# Patient Record
Sex: FEMALE | Race: WHITE | NOT HISPANIC OR LATINO | Employment: FULL TIME | URBAN - METROPOLITAN AREA
[De-identification: names, ages, dates, MRNs, and addresses within clinical notes are randomized per-mention and may not be internally consistent; named-entity substitution may affect disease eponyms.]

---

## 2017-01-09 ENCOUNTER — MYC MEDICAL ADVICE (OUTPATIENT)
Dept: FAMILY MEDICINE | Facility: CLINIC | Age: 42
End: 2017-01-09

## 2017-01-11 ENCOUNTER — OFFICE VISIT (OUTPATIENT)
Dept: FAMILY MEDICINE | Facility: CLINIC | Age: 42
End: 2017-01-11
Payer: COMMERCIAL

## 2017-01-11 VITALS
HEIGHT: 67 IN | BODY MASS INDEX: 25.15 KG/M2 | HEART RATE: 118 BPM | TEMPERATURE: 98.3 F | OXYGEN SATURATION: 98 % | WEIGHT: 160.25 LBS | SYSTOLIC BLOOD PRESSURE: 144 MMHG | DIASTOLIC BLOOD PRESSURE: 72 MMHG

## 2017-01-11 DIAGNOSIS — N63.0 LUMP OR MASS IN BREAST: Primary | ICD-10-CM

## 2017-01-11 PROCEDURE — 99213 OFFICE O/P EST LOW 20 MIN: CPT | Performed by: FAMILY MEDICINE

## 2017-01-11 ASSESSMENT — PAIN SCALES - GENERAL: PAINLEVEL: NO PAIN (0)

## 2017-01-11 NOTE — PROGRESS NOTES
SUBJECTIVE:                                                    S:  Bhavana Becker is a 41 year old female who presents to the clinic complaining of a right sided breast lump. Patient states that she was doing a routine self breast exam about a week ago. She noticed a hard lump on the right breast, under her nipple. She says that this lump is hard, immobile, and is not painful. Patient had a Mammogram and U/S in 2015 after experiencing nipple discharge, at which time a couple simple cysts were found on the right breast. Patient denies any nipple leakage, pain, swelling, redness, or other associated symptoms. No PMHx of FHx of breast cancer.     Problem list and histories reviewed & adjusted, as indicated.  Additional history: as documented    Patient Active Problem List   Diagnosis     CARDIOVASCULAR SCREENING; LDL GOAL LESS THAN 160     Past Surgical History   Procedure Laterality Date     Dilation and curettage, operative hysteroscopy, combined N/A 1/15/2015     removal of endometrial polyp       Social History   Substance Use Topics     Smoking status: Former Smoker -- 1.00 packs/day for 10 years     Types: Cigarettes     Quit date: 2000     Smokeless tobacco: Not on file      Comment: quit smoking in .     Alcohol Use: No     Family History   Problem Relation Age of Onset     CANCER No family hx of      breast     CEREBROVASCULAR DISEASE Maternal Grandmother       of this in her  60-70's     Lipids Mother      Hypertension Mother      Arthritis Mother      age 63.     Circulatory Mother      Gynecology Mother      cyst on ovary removed at age 34     Cancer - colorectal Maternal Grandfather      at age 70's     Respiratory Maternal Grandfather      lung Cancer and empysema     CANCER Paternal Grandmother      brain cancer at age 80's.     GASTROINTESTINAL DISEASE Father      gallbladder removed at age 61     Lipids Father      CEREBROVASCULAR DISEASE Father      X2       HEART DISEASE  "Father      pacemaker/ablation 2009     HEART DISEASE Mother      SOB, elev. BP 11/09 - believed to be MI, but confirmed no MI (RBBB)         Current Outpatient Prescriptions   Medication Sig Dispense Refill     IBUPROFEN PO Take 400 mg by mouth every 6 hours as needed for moderate pain       Multiple Vitamin (MULTI-VITAMIN PO) Take  by mouth. 1 daily       Allergies   Allergen Reactions     Codeine      Propoxyphene      Darvocet     Sulfa Drugs Other (See Comments)     Corneal ulcer from eye drops     ROS:  All other ROS are negative or non-contributory except as stated in HPI.    OBJECTIVE:                                                    /72 mmHg  Pulse 118  Temp(Src) 98.3  F (36.8  C) (Tympanic)  Ht 5' 7.3\" (1.709 m)  Wt 160 lb 4 oz (72.689 kg)  BMI 24.89 kg/m2  SpO2 98%  Body mass index is 24.89 kg/(m^2).   Vitals noted.  Patient alert, oriented, and in no acute distress.  CV:  RRR without murmur.  Respiratory:  Lungs clear to auscultation bilaterally.  Breasts: Visual inspection of the breasts is normal without skin changes.  Palpation reveals a small, sub-cm, firm nodule at 8 o'clock on the right breast, just adjacent to the areola. No axillary masses.    Diagnostic Test Results:  Orders Placed This Encounter   Procedures     MA Diagnostic Digital Bilateral     US Breast Bilateral Limited 1-3 Quadrants        ASSESSMENT:                                                        ICD-10-CM    1. Lump or mass in breast N63 MA Diagnostic Digital Bilateral     US Breast Bilateral Limited 1-3 Quadrants       PLAN:                                                      I am able to palpate a firm nodule in the patient's right breast. In her last Mammogram, there were some cystic nodules noted so this could definitely be cystic, but is quite firm and small, so difficult to tell. I encouraged her to further evaluate. Diagnostic Mammogram ordered and scheduled, see orders. I will notify patient with results " and discuss further evaluation/ treatment if needed at that time. Patient is in agreement with this plan. Follow up for diagnostic mammogram, or sooner with questions/ concerns.     This document serves as a record of services personally performed by Flori Acosta MD. It was created on their behalf by Sue Gil, a trained medical scribe. The creation of this record is based on the provider's personal observations and the statements of the patient. This document has been checked and approved by the attending provider.    Flori Acosta MD  [facility]

## 2017-01-11 NOTE — NURSING NOTE
"Chief Complaint   Patient presents with     Mass     Right Breast       Initial /72 mmHg  Pulse 118  Temp(Src) 98.3  F (36.8  C) (Tympanic)  Ht 5' 7.3\" (1.709 m)  Wt 160 lb 4 oz (72.689 kg)  BMI 24.89 kg/m2  SpO2 98% Estimated body mass index is 24.89 kg/(m^2) as calculated from the following:    Height as of this encounter: 5' 7.3\" (1.709 m).    Weight as of this encounter: 160 lb 4 oz (72.689 kg).  BP completed using cuff size: ruiz Pop MA  "

## 2017-01-17 ENCOUNTER — HOSPITAL ENCOUNTER (OUTPATIENT)
Dept: MAMMOGRAPHY | Facility: CLINIC | Age: 42
Discharge: HOME | End: 2017-01-17
Attending: FAMILY MEDICINE | Admitting: FAMILY MEDICINE
Payer: COMMERCIAL

## 2017-01-17 ENCOUNTER — HOSPITAL ENCOUNTER (OUTPATIENT)
Dept: ULTRASOUND IMAGING | Facility: CLINIC | Age: 42
End: 2017-01-17
Attending: FAMILY MEDICINE
Payer: COMMERCIAL

## 2017-01-17 DIAGNOSIS — N63.0 LUMP OR MASS IN BREAST: ICD-10-CM

## 2017-01-17 PROCEDURE — G0204 DX MAMMO INCL CAD BI: HCPCS

## 2017-01-17 PROCEDURE — 76642 ULTRASOUND BREAST LIMITED: CPT | Mod: RT

## 2017-01-24 NOTE — PROGRESS NOTES
Quick Note:    Bhavana, it looks like they already discussed your results of your mammogram and ultrasound, you have a cyst there. Nothing concerning at all, which is good. We really don't need to do anything further on this but if you have any questions or concerns please let me know.   Flori Acosta MD  ______

## 2017-02-21 ENCOUNTER — MYC MEDICAL ADVICE (OUTPATIENT)
Dept: FAMILY MEDICINE | Facility: CLINIC | Age: 42
End: 2017-02-21

## 2017-12-05 ENCOUNTER — OFFICE VISIT (OUTPATIENT)
Dept: FAMILY MEDICINE | Facility: CLINIC | Age: 42
End: 2017-12-05
Payer: COMMERCIAL

## 2017-12-05 VITALS
HEART RATE: 101 BPM | RESPIRATION RATE: 20 BRPM | OXYGEN SATURATION: 96 % | BODY MASS INDEX: 25.3 KG/M2 | HEIGHT: 67 IN | WEIGHT: 161.2 LBS | DIASTOLIC BLOOD PRESSURE: 70 MMHG | SYSTOLIC BLOOD PRESSURE: 110 MMHG | TEMPERATURE: 97.7 F

## 2017-12-05 DIAGNOSIS — Z12.4 SCREENING FOR MALIGNANT NEOPLASM OF CERVIX: ICD-10-CM

## 2017-12-05 DIAGNOSIS — Z00.00 ENCOUNTER FOR ROUTINE ADULT HEALTH EXAMINATION WITHOUT ABNORMAL FINDINGS: Primary | ICD-10-CM

## 2017-12-05 DIAGNOSIS — Z12.39 SCREENING FOR MALIGNANT NEOPLASM OF BREAST: ICD-10-CM

## 2017-12-05 DIAGNOSIS — Z23 NEED FOR PROPHYLACTIC VACCINATION AND INOCULATION AGAINST INFLUENZA: ICD-10-CM

## 2017-12-05 PROCEDURE — 90686 IIV4 VACC NO PRSV 0.5 ML IM: CPT | Performed by: FAMILY MEDICINE

## 2017-12-05 PROCEDURE — 99396 PREV VISIT EST AGE 40-64: CPT | Mod: 25 | Performed by: FAMILY MEDICINE

## 2017-12-05 PROCEDURE — 90471 IMMUNIZATION ADMIN: CPT | Performed by: FAMILY MEDICINE

## 2017-12-05 PROCEDURE — 87624 HPV HI-RISK TYP POOLED RSLT: CPT | Performed by: FAMILY MEDICINE

## 2017-12-05 PROCEDURE — G0145 SCR C/V CYTO,THINLAYER,RESCR: HCPCS | Performed by: FAMILY MEDICINE

## 2017-12-05 NOTE — MR AVS SNAPSHOT
After Visit Summary   12/5/2017    Bhavana Becker    MRN: [id number]           Patient Information     Date Of Birth          1975        Visit Information        Provider Department      12/5/2017 3:00 PM Flori Acosta MD [facility]        Today's Diagnoses     Encounter for routine adult health examination without abnormal findings    -  1    Need for prophylactic vaccination and inoculation against influenza        Screening for malignant neoplasm of breast        Screening for malignant neoplasm of cervix           Follow-ups after your visit        Who to contact     If you have questions or need follow up information about today's clinic visit or your schedule please contact [facility] directly at [phone].  Normal or non-critical lab and imaging results will be communicated to you by MyChart, letter or phone within 4 business days after the clinic has received the results. If you do not hear from us within 7 days, please contact the clinic through [employer] or phone. If you have a critical or abnormal lab result, we will notify you by phone as soon as possible.  Submit refill requests through [employer] or call your pharmacy and they will forward the refill request to us. Please allow 3 business days for your refill to be completed.          Additional Information About Your Visit        MyChart Information     [employer] gives you secure access to your electronic health record. If you see a primary care provider, you can also send messages to your care team and make appointments. If you have questions, please call your primary care clinic.  If you do not have a primary care provider, please call [phone] and they will assist you.        Care EveryWhere ID     This is your Care EveryWhere ID. This could be used by other organizations to access your [facility] medical records  [id number]        Your Vitals Were     Pulse Temperature  "Respirations Height Last Period Pulse Oximetry    101 97.7  F (36.5  C) (Temporal) 20 5' 7.25\" (1.708 m) 11/21/2017 (Approximate) 96%    Breastfeeding? BMI (Body Mass Index)                No 25.06 kg/m2           Blood Pressure from Last 3 Encounters:   12/05/17 110/70   01/11/17 144/72   11/11/16 120/72    Weight from Last 3 Encounters:   12/05/17 161 lb 3.2 oz (73.1 kg)   01/11/17 160 lb 4 oz (72.7 kg)   11/11/16 159 lb 8 oz (72.3 kg)              We Performed the Following     FLU VAC, SPLIT VIRUS IM > 3 YO (QUADRIVALENT) [71595]     HPV High Risk Types DNA Cervical     Pap imaged thin layer screen with HPV - recommended age 30 - 65 years (select HPV order below)     Vaccine Administration, Initial [52960]        Primary Care Provider Office Phone # Fax #    Flori Barbara Acosta [phone] [address] [place] [address]        Equal Access to Services     [facility]: Hadii martha sorto hadasho Soomaali, waaxda luqadaha, qaybta kaalmada emmanuel, gissell wolf. So [facility] [phone].    ATENCIÓN: Si habla español, tiene a mcclain disposición servicios gratuitos de asistencia lingüística. Llame al [phone].    We comply with applicable federal civil rights laws and [place] laws. We do not discriminate on the basis of race, color, national origin, age, disability, sex, sexual orientation, or gender identity.            Thank you!     Thank you for choosing [facility]  for your care. Our goal is always to provide you with excellent care. Hearing back from our patients is one way we can continue to improve our services. Please take a few minutes to complete the written survey that you may receive in the mail after your visit with us. Thank you!             Your Updated Medication List - Protect others around you: Learn how to safely use, store and throw away your medicines at www.disposemymeds.org.          This list is accurate as of: " 12/5/17 11:59 PM.  Always use your most recent med list.                   Brand Name Dispense Instructions for use Diagnosis    IBUPROFEN PO      Take 400 mg by mouth every 6 hours as needed for moderate pain        MULTI-VITAMIN PO      Take  by mouth. 1 daily

## 2017-12-05 NOTE — NURSING NOTE
"Chief Complaint   Patient presents with     Physical       Initial /70  Pulse 101  Temp 97.7  F (36.5  C) (Temporal)  Resp 20  Ht 5' 7.25\" (1.708 m)  Wt 161 lb 3.2 oz (73.1 kg)  SpO2 96%  BMI 25.06 kg/m2 Estimated body mass index is 25.06 kg/(m^2) as calculated from the following:    Height as of this encounter: 5' 7.25\" (1.708 m).    Weight as of this encounter: 161 lb 3.2 oz (73.1 kg).  Medication Reconciliation: complete   Ingris Dela Cruz CMA    "

## 2017-12-05 NOTE — PROGRESS NOTES
SUBJECTIVE:   CC: Bhavana Becker is an 42 year old woman who presents for preventive health visit.     Physical   Annual:     Getting at least 3 servings of Calcium per day::  Yes    Bi-annual eye exam::  Yes    Dental care twice a year::  Yes    Sleep apnea or symptoms of sleep apnea::  None    Diet::  Regular (no restrictions)    Frequency of exercise::  2-3 days/week    Duration of exercise::  15-30 minutes    Taking medications regularly::  Not Applicable    Additional concerns today::  No        Today's PHQ-2 Score: PHQ-2 ( 1999 Pfizer) 12/5/2017   Q1: Little interest or pleasure in doing things 0   Q2: Feeling down, depressed or hopeless 0   PHQ-2 Score 0   Q1: Little interest or pleasure in doing things Not at all   Q2: Feeling down, depressed or hopeless Not at all   PHQ-2 Score 0       Abuse: Current or Past(Physical, Sexual or Emotional)- No  Do you feel safe in your environment - Yes    Social History   Substance Use Topics     Smoking status: Former Smoker     Packs/day: 1.00     Years: 10.00     Types: Cigarettes     Quit date: 6/1/2000     Smokeless tobacco: Not on file      Comment: quit smoking in 2000.     Alcohol use No     The patient does not drink >3 drinks per day nor >7 drinks per week.    Reviewed orders with patient.  Reviewed health maintenance and updated orders accordingly - Yes  BP Readings from Last 3 Encounters:   12/05/17 110/70   01/11/17 144/72   11/11/16 120/72    Wt Readings from Last 3 Encounters:   12/05/17 161 lb 3.2 oz (73.1 kg)   01/11/17 160 lb 4 oz (72.7 kg)   11/11/16 159 lb 8 oz (72.3 kg)                  Patient Active Problem List   Diagnosis     CARDIOVASCULAR SCREENING; LDL GOAL LESS THAN 160     Past Surgical History:   Procedure Laterality Date     DILATION AND CURETTAGE, OPERATIVE HYSTEROSCOPY, COMBINED N/A 1/15/2015    removal of endometrial polyp       Social History   Substance Use Topics     Smoking status: Former Smoker     Packs/day: 1.00     Years:  10.00     Types: Cigarettes     Quit date: 2000     Smokeless tobacco: Never Used      Comment: quit smoking in .     Alcohol use No     Family History   Problem Relation Age of Onset     CEREBROVASCULAR DISEASE Maternal Grandmother       of this in her  60-70's     Lipids Mother      Hypertension Mother      Arthritis Mother      age 63.     Circulatory Mother      Gynecology Mother      cyst on ovary removed at age 34     HEART DISEASE Mother      SOB, elev. BP  - believed to be MI, but confirmed no MI (RBBB)     GASTROINTESTINAL DISEASE Father      gallbladder removed at age 61     Lipids Father      CEREBROVASCULAR DISEASE Father      X2       HEART DISEASE Father      pacemaker/ablation      Cancer - colorectal Maternal Grandfather      at age 70's     Respiratory Maternal Grandfather      lung Cancer and empysema     CANCER Paternal Grandmother      brain cancer at age 80's.         Current Outpatient Prescriptions   Medication Sig Dispense Refill     Multiple Vitamin (MULTI-VITAMIN PO) Take  by mouth. 1 daily       IBUPROFEN PO Take 400 mg by mouth every 6 hours as needed for moderate pain       Allergies   Allergen Reactions     Codeine      Propoxyphene      Darvocet     Sulfa Drugs Other (See Comments)     Corneal ulcer from eye drops           Patient under age 50, mutual decision reflected in health maintenance.        Pertinent mammograms are reviewed under the imaging tab.  History of abnormal Pap smear: NO - age 30- 65 PAP every 3 years recommended    Reviewed and updated as needed this visit by clinical staff         Reviewed and updated as needed this visit by Provider          Review of Systems  C: NEGATIVE for fever, chills, change in weight  I: NEGATIVE for worrisome rashes, moles or lesions  E: NEGATIVE for vision changes or irritation  ENT: NEGATIVE for ear, mouth and throat problems  R: NEGATIVE for significant cough or SOB  B: NEGATIVE for masses, tenderness or  "discharge  CV: NEGATIVE for chest pain, palpitations or peripheral edema  GI: NEGATIVE for nausea, abdominal pain, heartburn, or change in bowel habits  : NEGATIVE for unusual urinary or vaginal symptoms. Periods are regular.  M: NEGATIVE for significant arthralgias or myalgia  N: NEGATIVE for weakness, dizziness or paresthesias  P: NEGATIVE for changes in mood or affect     OBJECTIVE:   /70  Pulse 101  Temp 97.7  F (36.5  C) (Temporal)  Resp 20  Ht 5' 7.25\" (1.708 m)  Wt 161 lb 3.2 oz (73.1 kg)  LMP 11/21/2017 (Approximate)  SpO2 96%  Breastfeeding? No  BMI 25.06 kg/m2  Physical Exam  GENERAL: healthy, alert and no distress  EYES: Eyes grossly normal to inspection, PERRL and conjunctivae and sclerae normal  HENT: ear canals and TM's normal, nose and mouth without ulcers or lesions  NECK: no adenopathy, no asymmetry, masses, or scars and thyroid normal to palpation  RESP: lungs clear to auscultation - no rales, rhonchi or wheezes  BREAST: normal without masses, tenderness or nipple discharge and no palpable axillary masses or adenopathy  CV: regular rate and rhythm, normal S1 S2, no S3 or S4, no murmur, click or rub, no peripheral edema and peripheral pulses strong  ABDOMEN: soft, nontender, no hepatosplenomegaly, no masses and bowel sounds normal  Vaginal exam reveals normal external and internal genitalia.  Cervix is closed, long and thick.  No lesions or abnormalities seen.  Pap collected. Bimanual exam reveals a nontender, nongravid uterus with no CMT.  No adnexal masses or tenderness.    MS: no gross musculoskeletal defects noted, no edema  SKIN: no suspicious lesions or rashes  NEURO: Normal strength and tone, mentation intact and speech normal  PSYCH: mentation appears normal, affect normal/bright    ASSESSMENT/PLAN:       ICD-10-CM    1. Encounter for routine adult health examination without abnormal findings Z00.00    2. Need for prophylactic vaccination and inoculation against influenza " "Z23 FLU VAC, SPLIT VIRUS IM > 3 YO (QUADRIVALENT) [63599]     Vaccine Administration, Initial [41806]   3. Screening for malignant neoplasm of breast Z12.31 MA Screen Bilateral w/Stu   4. Screening for malignant neoplasm of cervix Z12.4 Pap imaged thin layer screen with HPV - recommended age 30 - 65 years (select HPV order below)     HPV High Risk Types DNA Cervical       COUNSELING:  Reviewed preventive health counseling, as reflected in patient instructions       Regular exercise       Healthy diet/nutrition       Vision screening       Immunizations    Vaccinated for: Influenza           Contraception       Osteoporosis Prevention/Bone Health           reports that she quit smoking about 17 years ago. Her smoking use included Cigarettes. She has a 10.00 pack-year smoking history. She does not have any smokeless tobacco history on file.    Estimated body mass index is 24.88 kg/(m^2) as calculated from the following:    Height as of 1/11/17: 5' 7.3\" (1.709 m).    Weight as of 1/11/17: 160 lb 4 oz (72.7 kg).         Counseling Resources:  ATP IV Guidelines  Pooled Cohorts Equation Calculator  Breast Cancer Risk Calculator  FRAX Risk Assessment  ICSI Preventive Guidelines  Dietary Guidelines for Americans, 2010  [employer]'s MyPlate  ASA Prophylaxis  Lung CA Screening    Flori Acosta MD  [facility] Influenza Immunization Documentation    1.  Is the person to be vaccinated sick today?   No    2. Does the person to be vaccinated have an allergy to a component   of the vaccine?   No  Egg Allergy Algorithm Link    3. Has the person to be vaccinated ever had a serious reaction   to influenza vaccine in the past?   No    4. Has the person to be vaccinated ever had Guillain-Barré syndrome?   No    Form completed by Ingris Dela Cruz CMA           "

## 2017-12-07 LAB
COPATH REPORT: NORMAL
PAP: NORMAL

## 2017-12-11 LAB
FINAL DIAGNOSIS: NORMAL
HPV HR 12 DNA CVX QL NAA+PROBE: NEGATIVE
HPV16 DNA SPEC QL NAA+PROBE: NEGATIVE
HPV18 DNA SPEC QL NAA+PROBE: NEGATIVE
SPECIMEN DESCRIPTION: NORMAL

## 2018-01-22 ENCOUNTER — HOSPITAL ENCOUNTER (OUTPATIENT)
Dept: MAMMOGRAPHY | Facility: CLINIC | Age: 43
Discharge: HOME | End: 2018-01-22
Attending: FAMILY MEDICINE | Admitting: FAMILY MEDICINE
Payer: COMMERCIAL

## 2018-01-22 DIAGNOSIS — Z12.39 SCREENING FOR MALIGNANT NEOPLASM OF BREAST: ICD-10-CM

## 2018-01-22 DIAGNOSIS — Z12.31 VISIT FOR SCREENING MAMMOGRAM: ICD-10-CM

## 2018-01-22 PROCEDURE — 77067 SCR MAMMO BI INCL CAD: CPT

## 2018-09-17 ENCOUNTER — VIRTUAL VISIT (OUTPATIENT)
Dept: FAMILY MEDICINE | Facility: OTHER | Age: 43
End: 2018-09-17

## 2018-09-18 NOTE — PROGRESS NOTES
"Date:   Clinician: Holden Concepcion  Clinician NPI: [id number]  Patient: Bhavana Becker  Patient : 1975  Patient Address: [address] [place] [id number]  Patient Phone: [phone]  Visit Protocol: Ear pain  Patient Summary:  Bhavana is a 43 year old ( : 1975 ) female who initiated a Visit for swimmer's ear (ear pain). When asked the question \"Please sign me up to receive news, health information and promotions. \", Bhavana responded \"No\".    Bhavana uploaded images of her ear(s).   Bhavana reports that her ear pain started yesterday. The ear pain is located inside the right ear.    Symptom Details   Pain: Bhavana is experiencing moderate pain. It gets worse when she gently pulls on the earlobe(s). It does not get worse when she eats or chews.    Bhavana denies a feeling of fullness, itchiness, redness, and tenderness in the ear(s). She also denies feeling feverish, having fluid draining from the ear(s), recent injuries near the ear(s), ever having ear tubes, and the possibility of a foreign object in the ear(s). Bhavana denies flying and swimming within the past week.    Weight: 75 lbs   She denies pregnancy and denies breastfeeding. She does not menstruate.   She does not smoke or use smokeless tobacco.   MEDICATIONS: No current medications, ALLERGIES: NKDA  Clinician Response:  Dear Bhavana,  Based upon the information you provided, you may have otitis externa. External otitis, also known as swimmer's ear, is a condition that occurs when the ear canal becomes irritated or infected.   I am prescribing:   Neomycin-polymyxin-hydrocortisone 1% otic ear drops. Instill 4 drops into affected ear(s) 3 times per day for 7 days. There are no refills with this prescription.   Instructions for using eardrops:     Lie on your side or tilt your head    Insert the drops into your ear canal    Lie on your side or insert a cotton ball in the ear canal for 5 minutes    Use the medication for " the number of days recommended, even if you begin to feel better within a few days after starting the drops     Avoid getting water inside your ear while you are being treated for swimmer's ear.  Use a cotton ball coated with petroleum jelly to protect your ears when bathing and showering.  Do not go swimming or diving for the next 7-10 days.  Do not wear headphones or hearing aid in the infected ears until your symptoms improve.  Please see your healthcare provider or urgent care clinic if your symptoms do not improve within 2 days.   Diagnosis: Otitis externa  Diagnosis ICD: H60.399  Prescription: neomycin-polymyxin-HC 3.5-10,000-1 mg/mL-unit/mL-% otic (ear) drops,suspension 1 10 ml dropper bottle, 7 days supply. Instill 4 drops into affected ear(s) 3 times per day for 7 days. Refills: 0, Refill as needed: no, Allow substitutions: yes  Pharmacy: [place] [id number] [place] [facility] - [phone] - [address] [id number]

## 2018-12-07 ENCOUNTER — OFFICE VISIT (OUTPATIENT)
Dept: FAMILY MEDICINE | Facility: CLINIC | Age: 43
End: 2018-12-07
Payer: COMMERCIAL

## 2018-12-07 VITALS
TEMPERATURE: 98.1 F | DIASTOLIC BLOOD PRESSURE: 86 MMHG | BODY MASS INDEX: 26.24 KG/M2 | HEIGHT: 67 IN | HEART RATE: 78 BPM | WEIGHT: 167.2 LBS | SYSTOLIC BLOOD PRESSURE: 124 MMHG | RESPIRATION RATE: 20 BRPM | OXYGEN SATURATION: 97 %

## 2018-12-07 DIAGNOSIS — Z00.00 ENCOUNTER FOR ROUTINE ADULT HEALTH EXAMINATION WITHOUT ABNORMAL FINDINGS: Primary | ICD-10-CM

## 2018-12-07 DIAGNOSIS — Z12.39 SCREENING FOR MALIGNANT NEOPLASM OF BREAST: ICD-10-CM

## 2018-12-07 DIAGNOSIS — G43.829 MENSTRUAL MIGRAINE WITHOUT STATUS MIGRAINOSUS, NOT INTRACTABLE: ICD-10-CM

## 2018-12-07 DIAGNOSIS — N92.0 MENORRHAGIA WITH REGULAR CYCLE: ICD-10-CM

## 2018-12-07 DIAGNOSIS — Z23 NEED FOR PROPHYLACTIC VACCINATION AND INOCULATION AGAINST INFLUENZA: ICD-10-CM

## 2018-12-07 LAB
HGB BLD-MCNC: 13.8 G/DL (ref 11.7–15.7)
TSH SERPL DL<=0.005 MIU/L-ACNC: 1.16 MU/L (ref 0.4–4)

## 2018-12-07 PROCEDURE — 90686 IIV4 VACC NO PRSV 0.5 ML IM: CPT | Performed by: FAMILY MEDICINE

## 2018-12-07 PROCEDURE — 85018 HEMOGLOBIN: CPT | Performed by: FAMILY MEDICINE

## 2018-12-07 PROCEDURE — 90471 IMMUNIZATION ADMIN: CPT | Performed by: FAMILY MEDICINE

## 2018-12-07 PROCEDURE — 84443 ASSAY THYROID STIM HORMONE: CPT | Performed by: FAMILY MEDICINE

## 2018-12-07 PROCEDURE — 36415 COLL VENOUS BLD VENIPUNCTURE: CPT | Performed by: FAMILY MEDICINE

## 2018-12-07 PROCEDURE — 99396 PREV VISIT EST AGE 40-64: CPT | Mod: 25 | Performed by: FAMILY MEDICINE

## 2018-12-07 ASSESSMENT — ENCOUNTER SYMPTOMS
BREAST MASS: 0
FREQUENCY: 0
NAUSEA: 0
EYE PAIN: 0
SORE THROAT: 0
PARESTHESIAS: 0
COUGH: 0
CONSTIPATION: 0
SHORTNESS OF BREATH: 0
HEMATURIA: 0
NERVOUS/ANXIOUS: 0
DIZZINESS: 0
MYALGIAS: 0
ARTHRALGIAS: 0
FEVER: 0
CHILLS: 0
WEAKNESS: 0
PALPITATIONS: 0
DYSURIA: 0
HEMATOCHEZIA: 0
DIARRHEA: 0
HEADACHES: 0
JOINT SWELLING: 0
HEARTBURN: 0
ABDOMINAL PAIN: 0

## 2018-12-07 ASSESSMENT — PAIN SCALES - GENERAL: PAINLEVEL: NO PAIN (0)

## 2018-12-07 NOTE — MR AVS SNAPSHOT
After Visit Summary   12/7/2018    Bhavana Becker    MRN: [id number]           Patient Information     Date Of Birth          1975        Visit Information        Provider Department      12/7/2018 8:00 AM Flori Acosta MD [facility]        Today's Diagnoses     Encounter for routine adult health examination without abnormal findings    -  1    Menorrhagia with regular cycle        Menstrual migraine without status migrainosus, not intractable        Screening for malignant neoplasm of breast        Need for prophylactic vaccination and inoculation against influenza          Care Instructions      Preventive Health Recommendations  Female Ages 40 to 49    Yearly exam:     See your health care provider every year in order to  1. Review health changes.   2. Discuss preventive care.    3. Review your medicines if your doctor prescribed any.      Get a Pap test every three years (unless you have an abnormal result and your provider advises testing more often).      If you get Pap tests with HPV test, you only need to test every 5 years, unless you have an abnormal result. You do not need a Pap test if your uterus was removed (hysterectomy) and you have not had cancer.      You should be tested each year for STDs (sexually transmitted diseases), if you're at risk.     Ask your doctor if you should have a mammogram.      Have a colonoscopy (test for colon cancer) if someone in your family has had colon cancer or polyps before age 50.       Have a cholesterol test every 5 years.       Have a diabetes test (fasting glucose) after age 45. If you are at risk for diabetes, you should have this test every 3 years.    Shots: Get a flu shot each year. Get a tetanus shot every 10 years.     Nutrition:     Eat at least 5 servings of fruits and vegetables each day.    Eat whole-grain bread, whole-wheat pasta and brown rice instead of white grains and rice.    Get adequate Calcium  and Vitamin D.      Lifestyle    Exercise at least 150 minutes a week (an average of 30 minutes a day, 5 days a week). This will help you control your weight and prevent disease.    Limit alcohol to one drink per day.    No smoking.     Wear sunscreen to prevent skin cancer.    See your dentist every six months for an exam and cleaning.          Follow-ups after your visit        Future tests that were ordered for you today     Open Future Orders        Priority Expected Expires Ordered    MA Screen Bilateral w/Stu Routine  12/7/2019 12/7/2018            Who to contact     If you have questions or need follow up information about today's clinic visit or your schedule please contact [facility] directly at [phone].  Normal or non-critical lab and imaging results will be communicated to you by The Web Collaboration Networkhart, letter or phone within 4 business days after the clinic has received the results. If you do not hear from us within 7 days, please contact the clinic through TellApartt or phone. If you have a critical or abnormal lab result, we will notify you by phone as soon as possible.  Submit refill requests through Face-Me or call your pharmacy and they will forward the refill request to us. Please allow 3 business days for your refill to be completed.          Additional Information About Your Visit        Face-Me Information     Face-Me gives you secure access to your electronic health record. If you see a primary care provider, you can also send messages to your care team and make appointments. If you have questions, please call your primary care clinic.  If you do not have a primary care provider, please call [phone] and they will assist you.        Care EveryWhere ID     This is your Care EveryWhere ID. This could be used by other organizations to access your [facility] medical records  [id number]        Your Vitals Were     Pulse Temperature Respirations Height Last Period Pulse Oximetry    78 98.1  F  "(36.7  C) (Temporal) 20 5' 7.45\" (1.713 m) 12/05/2018 (Exact Date) 97%    BMI (Body Mass Index)                   25.84 kg/m2            Blood Pressure from Last 3 Encounters:   12/07/18 124/86   12/05/17 110/70   01/11/17 144/72    Weight from Last 3 Encounters:   12/07/18 167 lb 3.2 oz (75.8 kg)   12/05/17 161 lb 3.2 oz (73.1 kg)   01/11/17 160 lb 4 oz (72.7 kg)              We Performed the Following     FLU VACCINE, SPLIT VIRUS, IM (QUADRIVALENT) [76666]- >3 YRS     Hemoglobin     TSH with free T4 reflex     Vaccine Administration, Initial [[id number]]        Primary Care Provider Office Phone # Fax #    Flori Barbara Acosta [phone] [address] [place] [address]        Equal Access to Services     MARY BOYCE : Hadii aad ku hadasho Soomaali, waaxda luqadaha, qaybta kaalmada adeegyaashish, gissell villalba . So [facility] [phone].    ATENCIÓN: Si jim wan, tiene a mcclain disposición servicios gratuitos de asistencia lingüística. Llame al [phone].    We comply with applicable federal civil rights laws and [place] laws. We do not discriminate on the basis of race, color, national origin, age, disability, sex, sexual orientation, or gender identity.            Thank you!     Thank you for choosing [facility]  for your care. Our goal is always to provide you with excellent care. Hearing back from our patients is one way we can continue to improve our services. Please take a few minutes to complete the written survey that you may receive in the mail after your visit with us. Thank you!             Your Updated Medication List - Protect others around you: Learn how to safely use, store and throw away your medicines at www.disposemymeds.org.          This list is accurate as of 12/7/18 12:15 PM.  Always use your most recent med list.                   Brand Name Dispense Instructions for use Diagnosis    IBUPROFEN PO      Take 400 mg by " mouth every 6 hours as needed for moderate pain        MULTI-VITAMIN PO      Take  by mouth. 1 daily

## 2019-01-25 ENCOUNTER — HOSPITAL ENCOUNTER (OUTPATIENT)
Dept: MAMMOGRAPHY | Facility: CLINIC | Age: 44
Discharge: HOME | End: 2019-01-25
Attending: FAMILY MEDICINE | Admitting: FAMILY MEDICINE
Payer: COMMERCIAL

## 2019-01-25 DIAGNOSIS — Z12.31 VISIT FOR SCREENING MAMMOGRAM: ICD-10-CM

## 2019-01-25 PROCEDURE — 77063 BREAST TOMOSYNTHESIS BI: CPT

## 2019-05-07 ENCOUNTER — MYC MEDICAL ADVICE (OUTPATIENT)
Dept: FAMILY MEDICINE | Facility: CLINIC | Age: 44
End: 2019-05-07

## 2019-06-08 ENCOUNTER — OFFICE VISIT (OUTPATIENT)
Dept: URGENT CARE | Facility: RETAIL CLINIC | Age: 44
End: 2019-06-08
Payer: COMMERCIAL

## 2019-06-08 VITALS — HEART RATE: 91 BPM | TEMPERATURE: 98.6 F | SYSTOLIC BLOOD PRESSURE: 136 MMHG | DIASTOLIC BLOOD PRESSURE: 84 MMHG

## 2019-06-08 DIAGNOSIS — H10.9 BACTERIAL CONJUNCTIVITIS OF RIGHT EYE: Primary | ICD-10-CM

## 2019-06-08 PROCEDURE — 99213 OFFICE O/P EST LOW 20 MIN: CPT | Performed by: PHYSICIAN ASSISTANT

## 2019-06-08 RX ORDER — CIPROFLOXACIN HYDROCHLORIDE 3.5 MG/ML
SOLUTION/ DROPS TOPICAL
Qty: 1 BOTTLE | Refills: 0 | Status: SHIPPED | OUTPATIENT
Start: 2019-06-08 | End: 2020-02-17

## 2019-06-08 NOTE — PROGRESS NOTES
Chief Complaint   Patient presents with     Eye Problem     Right; began this morning; sore, red eye; burning; put contacts in this morning and removed them due to being uncomfortable     SUBJECTIVE:  Bhavana Becker is a 44 year old female who presents for evaluation of moderate redness and irritaion in right eye for a few hours. Symptoms started when she woke up this morning. She put her contacts in and her eye felt irritated. She took her contacts out and the irritation has persisted. She is currently camping.  Patient denies pain, change in vision and light sensitivity.   Onset/timing: rapid.    Associated Signs and Symptoms: none  Treatment measures tried include: none  Contact wearer: Yes  History of corneal ulcer in left eye, .    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 1 Bottle 0     IBUPROFEN PO Take 400 mg by mouth every 6 hours as needed for moderate pain       Multiple Vitamin (MULTI-VITAMIN PO) Take  by mouth. 1 daily       Social History     Tobacco Use     Smoking status: Former Smoker     Packs/day: 1.00     Years: 10.00     Pack years: 10.00     Types: Cigarettes     Last attempt to quit: 2000     Years since quittin.0     Smokeless tobacco: Never Used     Tobacco comment: quit smoking in .   Substance Use Topics     Alcohol use: No     Alcohol/week: 1.0 oz     Allergies   Allergen Reactions     Codeine      Propoxyphene      Darvocet     Sulfa Drugs Other (See Comments)     Corneal ulcer from eye drops     REVIEW OF SYSTEMS  General: NEGATIVE for fever, chills.  Eyes: POSITIVE for redness, irritation, contacts. NEGATIVE for visual blurring, eye pain, photophobia,  tearing, itching, current discharge.  ENT: NEGATIVE for sore throat, nasal congestion.  Resp: NEGATIVE for cough.    OBJECTIVE:  /84 (BP Location: Left arm)   Pulse 91   Temp 98.6  F (37  C) (Oral)   General: awake alert and in no acute distress  EYES: Right eyelid without erythema, edema. EOM intact, PERRL. Sclera white, conjunctiva is mildly injected. No discharge or draining.  Left eyelid without erythema, edema. EOM intact, PERRL. Sclera white, conjunctiva is not injected. No discharge or draining.  HENT: Bilateral ear canals and TM's normal. Nasal turbinates nonedematous and nonerythematous. Posterior pharynx non-erythematous and without tonsillar hypertrophy.  NECK: supple, non-tender to palpation, no adenopathy noted  RESP: lungs clear to auscultation - no rales, rhonchi or wheezes  CV: regular rates and rhythm, normal S1 S2, no murmur noted    ASSESSMENT:    ICD-10-CM    1. Bacterial conjunctivitis of right eye H10.9 ciprofloxacin (CILOXAN) 0.3 % ophthalmic solution     PLAN:   Patient Instructions   CIPROFLOXACIN HCL 0.3 % OP SOLN  Use eye drops as directed for 7 days.  Wipe discharge away with wet paper towel and then discard.   Keep contact lens out of eyes for 1 week (during antibiotic eye drop course.)  Throw old contacts away and use new pair of contacts in 1 week  Sterilize contact case in boiling water or get new case.  Do not wear make up until symptoms resolve. Throw away all make up used 24 hours prior to symptom onset.   Wash your hands and avoid touching your eyes as much as possible to avoid spreading pink eye to others.  Wash previously used bath, face and hand towels. Do not share towels with others.  Stay out of activities for at least 24 hrs due to being contagious.   Follow up with your primary care provider if symptoms worsen or fail to improve.     Follow up with primary care provider with any problems, questions or concerns or if symptoms worsen or fail to improve. Patient  agreed to plan and verbalized understanding.    Raquel Reeder PA-C  [facility]

## 2019-06-08 NOTE — PATIENT INSTRUCTIONS
CIPROFLOXACIN HCL 0.3 % OP SOLN  Use eye drops as directed for 7 days.  Wipe discharge away with wet paper towel and then discard.   Keep contact lens out of eyes for 1 week (during antibiotic eye drop course.)  Throw old contacts away and use new pair of contacts in 1 week  Sterilize contact case in boiling water or get new case.  Do not wear make up until symptoms resolve. Throw away all make up used 24 hours prior to symptom onset.   Wash your hands and avoid touching your eyes as much as possible to avoid spreading pink eye to others.  Wash previously used bath, face and hand towels. Do not share towels with others.  Stay out of activities for at least 24 hrs due to being contagious.   Follow up with your primary care provider if symptoms worsen or fail to improve.

## 2019-09-13 ENCOUNTER — TELEPHONE (OUTPATIENT)
Dept: FAMILY MEDICINE | Facility: CLINIC | Age: 44
End: 2019-09-13

## 2019-09-13 NOTE — TELEPHONE ENCOUNTER
Left message for patient to call back and reschedule appointment for 12/06/2019. Please help her reschedule  Mara Walton on 9/13/2019 at 11:19 AM

## 2019-09-28 ENCOUNTER — HEALTH MAINTENANCE LETTER (OUTPATIENT)
Age: 44
End: 2019-09-28

## 2020-02-17 ENCOUNTER — OFFICE VISIT (OUTPATIENT)
Dept: FAMILY MEDICINE | Facility: CLINIC | Age: 45
End: 2020-02-17
Payer: COMMERCIAL

## 2020-02-17 ENCOUNTER — HOSPITAL ENCOUNTER (OUTPATIENT)
Dept: MAMMOGRAPHY | Facility: CLINIC | Age: 45
Discharge: HOME | End: 2020-02-17
Attending: FAMILY MEDICINE | Admitting: FAMILY MEDICINE
Payer: COMMERCIAL

## 2020-02-17 VITALS
SYSTOLIC BLOOD PRESSURE: 122 MMHG | TEMPERATURE: 97.7 F | HEIGHT: 68 IN | BODY MASS INDEX: 24.54 KG/M2 | RESPIRATION RATE: 16 BRPM | HEART RATE: 94 BPM | DIASTOLIC BLOOD PRESSURE: 76 MMHG | WEIGHT: 161.9 LBS | OXYGEN SATURATION: 98 %

## 2020-02-17 DIAGNOSIS — Z12.31 VISIT FOR SCREENING MAMMOGRAM: ICD-10-CM

## 2020-02-17 DIAGNOSIS — Z00.00 ROUTINE GENERAL MEDICAL EXAMINATION AT A HEALTH CARE FACILITY: Primary | ICD-10-CM

## 2020-02-17 LAB
CHOLEST SERPL-MCNC: 173 MG/DL
HDLC SERPL-MCNC: 83 MG/DL
HGB BLD-MCNC: 14 G/DL (ref 11.7–15.7)
LDLC SERPL CALC-MCNC: 81 MG/DL
NONHDLC SERPL-MCNC: 90 MG/DL
TRIGL SERPL-MCNC: 47 MG/DL
TSH SERPL DL<=0.005 MIU/L-ACNC: 1.06 MU/L (ref 0.4–4)

## 2020-02-17 PROCEDURE — 77063 BREAST TOMOSYNTHESIS BI: CPT

## 2020-02-17 PROCEDURE — 80061 LIPID PANEL: CPT | Performed by: NURSE PRACTITIONER

## 2020-02-17 PROCEDURE — 36415 COLL VENOUS BLD VENIPUNCTURE: CPT | Performed by: NURSE PRACTITIONER

## 2020-02-17 PROCEDURE — 85018 HEMOGLOBIN: CPT | Performed by: NURSE PRACTITIONER

## 2020-02-17 PROCEDURE — 84443 ASSAY THYROID STIM HORMONE: CPT | Performed by: NURSE PRACTITIONER

## 2020-02-17 PROCEDURE — 99396 PREV VISIT EST AGE 40-64: CPT | Performed by: NURSE PRACTITIONER

## 2020-02-17 ASSESSMENT — ENCOUNTER SYMPTOMS
SORE THROAT: 0
NAUSEA: 0
WEAKNESS: 0
PARESTHESIAS: 0
ABDOMINAL PAIN: 0
DIZZINESS: 0
COUGH: 0
JOINT SWELLING: 0
FEVER: 0
EYE PAIN: 0
HEMATOCHEZIA: 0
HEADACHES: 0
NERVOUS/ANXIOUS: 0
BREAST MASS: 0
CONSTIPATION: 0
DIARRHEA: 0
PALPITATIONS: 0
DYSURIA: 0
HEARTBURN: 0
CHILLS: 0
FREQUENCY: 0
SHORTNESS OF BREATH: 0
HEMATURIA: 0
MYALGIAS: 0
ARTHRALGIAS: 0

## 2020-02-17 ASSESSMENT — MIFFLIN-ST. JEOR: SCORE: 1434.93

## 2020-02-17 NOTE — PROGRESS NOTES
SUBJECTIVE:   CC: Bhavana Becker is an 44 year old woman who presents for preventive health visit.     Patient is fasting today. She would like lab work done.       Healthy Habits:     Getting at least 3 servings of Calcium per day:  Yes    Bi-annual eye exam:  Yes    Dental care twice a year:  Yes    Sleep apnea or symptoms of sleep apnea:  None    Diet:  Regular (no restrictions)    Frequency of exercise:  2-3 days/week    Duration of exercise:  Less than 15 minutes    Taking medications regularly:  Yes    Medication side effects:  None    PHQ-2 Total Score: 0    Additional concerns today:  No              Today's PHQ-2 Score:   PHQ-2 (  [employer]) 2020   Q1: Little interest or pleasure in doing things 0   Q2: Feeling down, depressed or hopeless 0   PHQ-2 Score 0   Q1: Little interest or pleasure in doing things Not at all   Q2: Feeling down, depressed or hopeless Not at all   PHQ-2 Score 0       Abuse: Current or Past(Physical, Sexual or Emotional)- No  Do you feel safe in your environment? Yes        Social History     Tobacco Use     Smoking status: Former Smoker     Packs/day: 1.00     Years: 10.00     Pack years: 10.00     Types: Cigarettes     Last attempt to quit: 2000     Years since quittin.7     Smokeless tobacco: Never Used     Tobacco comment: quit smoking in .   Substance Use Topics     Alcohol use: No     Alcohol/week: 1.7 standard drinks     If you drink alcohol do you typically have >3 drinks per day or >7 drinks per week? No    Alcohol Use 2020   Prescreen: >3 drinks/day or >7 drinks/week? No   Prescreen: >3 drinks/day or >7 drinks/week? -   No flowsheet data found.    Reviewed orders with patient.  Reviewed health maintenance and updated orders accordingly - Yes  Lab work is in process  BP Readings from Last 3 Encounters:   20 122/76   19 136/84   18 124/86    Wt Readings from Last 3 Encounters:   20 73.4 kg (161 lb 14.4 oz)   18 75.8 kg  (167 lb 3.2 oz)   17 73.1 kg (161 lb 3.2 oz)                  Patient Active Problem List   Diagnosis     CARDIOVASCULAR SCREENING; LDL GOAL LESS THAN 160     Past Surgical History:   Procedure Laterality Date     DILATION AND CURETTAGE, OPERATIVE HYSTEROSCOPY, COMBINED N/A 1/15/2015    removal of endometrial polyp       Social History     Tobacco Use     Smoking status: Former Smoker     Packs/day: 1.00     Years: 10.00     Pack years: 10.00     Types: Cigarettes     Last attempt to quit: 2000     Years since quittin.7     Smokeless tobacco: Never Used     Tobacco comment: quit smoking in .   Substance Use Topics     Alcohol use: No     Alcohol/week: 1.7 standard drinks     Family History   Problem Relation Age of Onset     Cerebrovascular Disease Maternal Grandmother          of this at age 72     Lipids Mother      Hypertension Mother      Arthritis Mother         age 63.     Circulatory Mother      Gynecology Mother         cyst on ovary removed at age 34     Heart Disease Mother         SOB, elev. BP  - believed to be MI, but confirmed no MI (RBBB)     Gastrointestinal Disease Father         gallbladder removed at age 61     Lipids Father      Cerebrovascular Disease Father         X2       Heart Disease Father         pacemaker/ablation      Cancer - colorectal Maternal Grandfather         at age 70's     Respiratory Maternal Grandfather         lung Cancer and empysema     Cancer Paternal Grandmother         brain cancer at age 80's.         Current Outpatient Medications   Medication Sig Dispense Refill     IBUPROFEN PO Take 400 mg by mouth every 6 hours as needed for moderate pain       Multiple Vitamin (MULTI-VITAMIN PO) Take  by mouth. 1 daily             Pertinent mammograms are reviewed under the imaging tab.  History of abnormal Pap smear: NO - age 30-65 PAP every 5 years with negative HPV co-testing recommended  PAP / HPV Latest Ref Rng & Units 2017  10/28/2011   PAP - NIL NIL NIL   HPV 16 DNA NEG:Negative Negative - -   HPV 18 DNA NEG:Negative Negative - -   OTHER HR HPV NEG:Negative Negative - -     Reviewed and updated as needed this visit by clinical staff  Tobacco  Allergies  Med Hx  Surg Hx  Fam Hx  Soc Hx        Reviewed and updated as needed this visit by Provider            Review of Systems   Constitutional: Negative for chills and fever.   HENT: Positive for congestion. Negative for ear pain, hearing loss and sore throat.    Eyes: Negative for pain and visual disturbance.   Respiratory: Negative for cough and shortness of breath.    Cardiovascular: Negative for chest pain, palpitations and peripheral edema.   Gastrointestinal: Negative for abdominal pain, constipation, diarrhea, heartburn, hematochezia and nausea.   Breasts:  Negative for tenderness, breast mass and discharge.   Genitourinary: Negative for dysuria, frequency, genital sores, hematuria, pelvic pain, urgency, vaginal bleeding and vaginal discharge.   Musculoskeletal: Negative for arthralgias, joint swelling and myalgias.   Skin: Negative for rash.   Neurological: Negative for dizziness, weakness, headaches and paresthesias.   Psychiatric/Behavioral: Negative for mood changes. The patient is not nervous/anxious.      Patient presents today for her annual physical. She is doing well. With no new acute symptoms of concern. She is recovering from a viral illness, a residual cough but no significant fevers or issues with breathing. She has monthly menses that are irregular, some are heavy/light. Has a history of endometrial polyps which were removed 5 years ago, menses are tolerable, she will let us know if this changes. No other new symptoms of concern. Needs labs and health maintenance completed for her employer.      OBJECTIVE:   /76 (BP Location: Right arm, Patient Position: Sitting, Cuff Size: Adult Large)   Pulse 94   Temp 97.7  F (36.5  C) (Temporal)   Resp 16   Ht 1.715  "m (5' 7.5\")   Wt 73.4 kg (161 lb 14.4 oz)   SpO2 98%   BMI 24.98 kg/m    Physical Exam  GENERAL: healthy, alert and no distress  EYES: Eyes grossly normal to inspection, PERRL and conjunctivae and sclerae normal  HENT: ear canals and TM's normal, nose and mouth without ulcers or lesions  NECK: no adenopathy, no asymmetry, masses, or scars and thyroid normal to palpation  RESP: lungs clear to auscultation - no rales, rhonchi or wheezes  BREAST: normal without masses, tenderness or nipple discharge and no palpable axillary masses or adenopathy  CV: regular rate and rhythm, normal S1 S2, no S3 or S4, no murmur, click or rub, no peripheral edema and peripheral pulses strong  ABDOMEN: soft, nontender, no hepatosplenomegaly, no masses and bowel sounds normal  MS: no gross musculoskeletal defects noted, no edema  SKIN: no suspicious lesions or rashes  NEURO: Normal strength and tone, mentation intact and speech normal  PSYCH: mentation appears normal, affect normal/bright    Diagnostic Test Results:  Labs reviewed in Epic    ASSESSMENT/PLAN:   1. Routine general medical examination at a health care facility  - She will be notified of results and changes in plan of care secondary to results.   - Hemoglobin  - TSH with free T4 reflex  - Lipid Profile (Chol, Trig, HDL, LDL calc)    COUNSELING:  Reviewed preventive health counseling, as reflected in patient instructions    Estimated body mass index is 24.98 kg/m  as calculated from the following:    Height as of this encounter: 1.715 m (5' 7.5\").    Weight as of this encounter: 73.4 kg (161 lb 14.4 oz).         reports that she quit smoking about 19 years ago. Her smoking use included cigarettes. She has a 10.00 pack-year smoking history. She has never used smokeless tobacco.      Counseling Resources:  ATP IV Guidelines  Pooled Cohorts Equation Calculator  Breast Cancer Risk Calculator  FRAX Risk Assessment  ICSI Preventive Guidelines  Dietary Guidelines for Americans, " 2010  USDA's MyPlate  ASA Prophylaxis  Lung CA Screening    Neftali Gomez, NP  [facility]

## 2021-01-06 ENCOUNTER — OFFICE VISIT (OUTPATIENT)
Dept: FAMILY MEDICINE | Facility: OTHER | Age: 46
End: 2021-01-06
Payer: COMMERCIAL

## 2021-01-06 ENCOUNTER — ANCILLARY PROCEDURE (OUTPATIENT)
Dept: GENERAL RADIOLOGY | Facility: OTHER | Age: 46
End: 2021-01-06
Attending: PHYSICIAN ASSISTANT
Payer: COMMERCIAL

## 2021-01-06 VITALS
RESPIRATION RATE: 16 BRPM | SYSTOLIC BLOOD PRESSURE: 122 MMHG | HEIGHT: 67 IN | BODY MASS INDEX: 25.99 KG/M2 | TEMPERATURE: 98.4 F | OXYGEN SATURATION: 99 % | WEIGHT: 165.6 LBS | DIASTOLIC BLOOD PRESSURE: 82 MMHG | HEART RATE: 90 BPM

## 2021-01-06 DIAGNOSIS — M25.561 ACUTE PAIN OF RIGHT KNEE: ICD-10-CM

## 2021-01-06 DIAGNOSIS — M25.561 ACUTE PAIN OF RIGHT KNEE: Primary | ICD-10-CM

## 2021-01-06 PROCEDURE — 99213 OFFICE O/P EST LOW 20 MIN: CPT | Performed by: PHYSICIAN ASSISTANT

## 2021-01-06 PROCEDURE — 73562 X-RAY EXAM OF KNEE 3: CPT | Mod: RT | Performed by: RADIOLOGY

## 2021-01-06 RX ORDER — MELOXICAM 15 MG/1
15 TABLET ORAL DAILY
Qty: 30 TABLET | Refills: 0 | Status: SHIPPED | OUTPATIENT
Start: 2021-01-06 | End: 2021-01-29

## 2021-01-06 ASSESSMENT — MIFFLIN-ST. JEOR: SCORE: 1442.04

## 2021-01-06 NOTE — PATIENT INSTRUCTIONS
- Knee sleeve- neoprene type of material.  Wear this during the day when you're active  - Ice over the knee at end of the day  - Mobic once daily with food in stomach.   - We will call with x-ray results.   - Avoiding kneeling on the knee, bending > 90 degrees or any pivoting activity.   - If x-ray is normal, and no improvement in the next 1-2 weeks let me know via IPLogict and we can get you scheduled with Dr. Rivera

## 2021-01-06 NOTE — PROGRESS NOTES
Assessment & Plan     Acute pain of right knee  Uncertain the exact cause of her pain, she has no signs of instability on exam or per history, higher suspicion for bursitis and possible tendonitis, also question IT Band syndrome.  Recommended compression of the knee, will try NSAID reminded of risks and benefits, referral placed for physical therapy.  If not improving in the next couple of weeks recommend follow-up with Sports Medicine.   - XR Knee Right 3 Views; Future  - meloxicam (MOBIC) 15 MG tablet; Take 1 tablet (15 mg) by mouth daily  - [facility], AND CHIROPRACTIC REFERRAL; Future      10 minutes spent on the date of the encounter doing chart review, patient visit and documentation     Return in about 2 weeks (around 1/20/2021) for If not improving, sooner if worse or new concerns.    Options for treatment and follow-up care were reviewed with the patient and/or guardian. Patient and/or guardian engaged in the decision making process and verbalized understanding of the options discussed and agreed with the final plan.    Wesley Hagan PA-C  [facility]     Bhavana Becker is a 45 year old who presents to clinic today for the following health issues right knee pain    HPI       Musculoskeletal problem/pain  Onset/Duration: started over the summer, pulled right knee with working out  Description  Location: knee - right  Joint Swelling: YES  Redness: no  Pain: off and on  Warmth: no  Intensity:  mild  Progression of Symptoms:  intermittent  Accompanying signs and symptoms:   Fevers: no  Numbness/tingling/weakness: no  History  Trauma to the area: YES  Recent illness:  no  Previous similar problem: no  Previous evaluation:  no  Precipitating or alleviating factors:  Aggravating factors include: walking and overuse  Therapies tried and outcome: ice and immobilization    - No specific injury but she was working out and was doing squats when she felt the discomfort.   "Doesn't think she twisted at all.  Doesn't recall any trauma directly to the knee.   - She had swelling then and it has continued to have issues with swelling.   - she says the pain is slightly less but still there.   - No catching or locking of the knee.  No numbness, tingling.  No redness or bruising.  No fevers or chills.    - No history of knee issues in the past.     Review of Systems   Constitutional,  musculoskeletal, neuro, skin systems are negative, except as otherwise noted.      Objective    /82   Pulse 90   Temp 98.4  F (36.9  C) (Temporal)   Resp 16   Ht 1.707 m (5' 7.21\")   Wt 75.1 kg (165 lb 9.6 oz)   LMP 01/05/2021 (Exact Date)   SpO2 99%   BMI 25.78 kg/m    Body mass index is 25.78 kg/m .  Physical Exam   GENERAL: healthy, alert and no distress  ABDOMEN: soft, nontender, no hepatosplenomegaly, no masses and bowel sounds normal  MS: no gross musculoskeletal defects noted, no edema.  RLE - full passive ROM of the hip without pain, full passive ROM of the knee without significant pain, tenderness to palpation over the medial and lateral joint line, non-tender over the patella, supra and infrapatellar tendons, non-tender in popliteal fossa, moderate effusion noted without erythema or  excessive warmth.  Tenderness along the lateral right thigh. Negative anterior and posterior drawer, normal valgus and varus stress, negative McMurrays bilaterally.   SKIN: no suspicious lesions or rashes  NEURO: Normal strength and tone, mentation intact and speech normal  PSYCH: mentation appears normal, affect normal/bright    Xr Knee Right 3 Views    Result Date: 1/6/2021  KNEE RIGHT THREE VIEWS    1/6/2021 4:00 PM HISTORY:  Acute pain of right knee     IMPRESSION: Mild joint effusion. There may be a small articular body at the anterior aspect of the notch. Minimal patellofemoral osteophytosis. No fracture identified. ARTHUR COOK MD        "

## 2021-01-09 ENCOUNTER — HEALTH MAINTENANCE LETTER (OUTPATIENT)
Age: 46
End: 2021-01-09

## 2021-01-29 DIAGNOSIS — M25.561 ACUTE PAIN OF RIGHT KNEE: ICD-10-CM

## 2021-01-29 RX ORDER — MELOXICAM 15 MG/1
TABLET ORAL
Qty: 30 TABLET | Refills: 0 | Status: SHIPPED | OUTPATIENT
Start: 2021-01-29 | End: 2021-02-23

## 2021-02-22 ASSESSMENT — ENCOUNTER SYMPTOMS
SHORTNESS OF BREATH: 0
NAUSEA: 0
DIARRHEA: 0
HEADACHES: 0
SORE THROAT: 0
CHILLS: 0
JOINT SWELLING: 0
FREQUENCY: 0
CONSTIPATION: 0
HEARTBURN: 0
DYSURIA: 0
BREAST MASS: 0
ARTHRALGIAS: 0
COUGH: 0
MYALGIAS: 0
NERVOUS/ANXIOUS: 0
HEMATURIA: 0
DIZZINESS: 0
EYE PAIN: 0
PALPITATIONS: 0
PARESTHESIAS: 0
ABDOMINAL PAIN: 0
HEMATOCHEZIA: 0
WEAKNESS: 0
FEVER: 0

## 2021-02-23 ENCOUNTER — HOSPITAL ENCOUNTER (OUTPATIENT)
Dept: MAMMOGRAPHY | Facility: CLINIC | Age: 46
Discharge: HOME | End: 2021-02-23
Attending: FAMILY MEDICINE | Admitting: FAMILY MEDICINE
Payer: COMMERCIAL

## 2021-02-23 ENCOUNTER — OFFICE VISIT (OUTPATIENT)
Dept: FAMILY MEDICINE | Facility: CLINIC | Age: 46
End: 2021-02-23
Payer: COMMERCIAL

## 2021-02-23 VITALS
BODY MASS INDEX: 24.4 KG/M2 | HEART RATE: 88 BPM | RESPIRATION RATE: 14 BRPM | DIASTOLIC BLOOD PRESSURE: 72 MMHG | TEMPERATURE: 98.2 F | SYSTOLIC BLOOD PRESSURE: 118 MMHG | OXYGEN SATURATION: 97 % | HEIGHT: 68 IN | WEIGHT: 161 LBS

## 2021-02-23 DIAGNOSIS — Z23 NEED FOR VACCINATION: ICD-10-CM

## 2021-02-23 DIAGNOSIS — Z12.31 VISIT FOR SCREENING MAMMOGRAM: ICD-10-CM

## 2021-02-23 DIAGNOSIS — Z00.00 ROUTINE GENERAL MEDICAL EXAMINATION AT A HEALTH CARE FACILITY: Primary | ICD-10-CM

## 2021-02-23 DIAGNOSIS — Z12.4 SCREENING FOR MALIGNANT NEOPLASM OF CERVIX: ICD-10-CM

## 2021-02-23 PROCEDURE — 90715 TDAP VACCINE 7 YRS/> IM: CPT | Performed by: FAMILY MEDICINE

## 2021-02-23 PROCEDURE — G0145 SCR C/V CYTO,THINLAYER,RESCR: HCPCS | Performed by: FAMILY MEDICINE

## 2021-02-23 PROCEDURE — 87624 HPV HI-RISK TYP POOLED RSLT: CPT | Performed by: FAMILY MEDICINE

## 2021-02-23 PROCEDURE — 77063 BREAST TOMOSYNTHESIS BI: CPT

## 2021-02-23 PROCEDURE — 90471 IMMUNIZATION ADMIN: CPT | Performed by: FAMILY MEDICINE

## 2021-02-23 PROCEDURE — 99396 PREV VISIT EST AGE 40-64: CPT | Mod: 25 | Performed by: FAMILY MEDICINE

## 2021-02-23 SDOH — HEALTH STABILITY: MENTAL HEALTH
STRESS IS WHEN SOMEONE FEELS TENSE, NERVOUS, ANXIOUS, OR CAN'T SLEEP AT NIGHT BECAUSE THEIR MIND IS TROUBLED. HOW STRESSED ARE YOU?: NOT ASKED

## 2021-02-23 SDOH — HEALTH STABILITY: MENTAL HEALTH: HOW OFTEN DO YOU HAVE A DRINK CONTAINING ALCOHOL?: MONTHLY OR LESS

## 2021-02-23 SDOH — HEALTH STABILITY: PHYSICAL HEALTH: ON AVERAGE, HOW MANY DAYS PER WEEK DO YOU ENGAGE IN MODERATE TO STRENUOUS EXERCISE (LIKE A BRISK WALK)?: 3 DAYS

## 2021-02-23 SDOH — HEALTH STABILITY: PHYSICAL HEALTH: ON AVERAGE, HOW MANY MINUTES DO YOU ENGAGE IN EXERCISE AT THIS LEVEL?: 10 MIN

## 2021-02-23 SDOH — HEALTH STABILITY: MENTAL HEALTH: HOW MANY STANDARD DRINKS CONTAINING ALCOHOL DO YOU HAVE ON A TYPICAL DAY?: 1 OR 2

## 2021-02-23 SDOH — HEALTH STABILITY: MENTAL HEALTH: HOW OFTEN DO YOU HAVE 6 OR MORE DRINKS ON ONE OCCASION?: NOT ASKED

## 2021-02-23 ASSESSMENT — ENCOUNTER SYMPTOMS
NERVOUS/ANXIOUS: 0
COUGH: 0
DIZZINESS: 0
DIARRHEA: 0
MYALGIAS: 0
DYSURIA: 0
JOINT SWELLING: 0
EYE PAIN: 0
FREQUENCY: 0
WEAKNESS: 0
CHILLS: 0
PARESTHESIAS: 0
SORE THROAT: 0
PALPITATIONS: 0
FEVER: 0
BREAST MASS: 0
NAUSEA: 0
SHORTNESS OF BREATH: 0
HEMATURIA: 0
ABDOMINAL PAIN: 0
HEADACHES: 0
HEMATOCHEZIA: 0
CONSTIPATION: 0
HEARTBURN: 0
ARTHRALGIAS: 0

## 2021-02-23 ASSESSMENT — MIFFLIN-ST. JEOR: SCORE: 1432.41

## 2021-02-23 NOTE — PROGRESS NOTES
SUBJECTIVE:   CC: Bhavana Becker is an 45 year old woman who presents for preventive health visit.       Patient has been advised of split billing requirements and indicates understanding: Yes  Healthy Habits:     Getting at least 3 servings of Calcium per day:  Yes    Bi-annual eye exam:  Yes    Dental care twice a year:  Yes    Sleep apnea or symptoms of sleep apnea:  None    Diet:  Regular (no restrictions)    Frequency of exercise:  2-3 days/week    Duration of exercise:  Less than 15 minutes    Taking medications regularly:  Yes    Barriers to taking medications:  Not applicable    Medication side effects:  None    PHQ-2 Total Score: 0    Additional concerns today:  No          Patient has no concerns at this time    Today's PHQ-2 Score:   PHQ-2 (  [employer]) 2021   Q1: Little interest or pleasure in doing things 0   Q2: Feeling down, depressed or hopeless 0   PHQ-2 Score 0   Q1: Little interest or pleasure in doing things Not at all   Q2: Feeling down, depressed or hopeless Not at all   PHQ-2 Score 0       Abuse: Current or Past (Physical, Sexual or Emotional) - No  Do you feel safe in your environment? Yes    Have you ever done Advance Care Planning? (For example, a Health Directive, POLST, or a discussion with a medical provider or your loved ones about your wishes): No, advance care planning information given to patient to review.  Patient declined advance care planning discussion at this time.    Social History     Tobacco Use     Smoking status: Former Smoker     Packs/day: 1.00     Years: 10.00     Pack years: 10.00     Types: Cigarettes     Quit date: 2000     Years since quittin.7     Smokeless tobacco: Never Used     Tobacco comment: quit smoking in .   Substance Use Topics     Alcohol use: Yes     Alcohol/week: 1.7 standard drinks     Frequency: Monthly or less     Drinks per session: 1 or 2     If you drink alcohol do you typically have >3 drinks per day or >7 drinks per  week? No    Alcohol Use 2/23/2021   Prescreen: >3 drinks/day or >7 drinks/week? -   Prescreen: >3 drinks/day or >7 drinks/week? No       Any new diagnosis of family breast, ovarian, or bowel cancer? No     Reviewed orders with patient.  Reviewed health maintenance and updated orders accordingly - Yes    Breast CA Risk Screening:  No flowsheet data found.    Mammogram Screening - Offered annual screening and updated Health Maintenance for mutual plan based on risk factor consideration    Pertinent mammograms are reviewed under the imaging tab.    History of abnormal Pap smear:  PAP / HPV Latest Ref Rng & Units 12/5/2017 11/7/2014 10/28/2011   PAP - NIL NIL NIL   HPV 16 DNA NEG:Negative Negative - -   HPV 18 DNA NEG:Negative Negative - -   OTHER HR HPV NEG:Negative Negative - -     Reviewed and updated as needed this visit by clinical staff  Tobacco  Allergies  Meds   Med Hx  Surg Hx  Fam Hx  Soc Hx        Reviewed and updated as needed this visit by Provider  Tobacco  Allergies  Meds  Problems  Med Hx  Surg Hx  Fam Hx  Soc Hx             Review of Systems   Constitutional: Negative for chills and fever.   HENT: Negative for congestion, ear pain, hearing loss and sore throat.    Eyes: Negative for pain and visual disturbance.   Respiratory: Negative for cough and shortness of breath.    Cardiovascular: Negative for chest pain, palpitations and peripheral edema.   Gastrointestinal: Negative for abdominal pain, constipation, diarrhea, heartburn, hematochezia and nausea.   Breasts:  Negative for tenderness, breast mass and discharge.   Genitourinary: Negative for dysuria, frequency, genital sores, hematuria, pelvic pain, urgency, vaginal bleeding and vaginal discharge.   Musculoskeletal: Negative for arthralgias, joint swelling and myalgias.   Skin: Negative for rash.   Neurological: Negative for dizziness, weakness, headaches and paresthesias.   Psychiatric/Behavioral: Negative for mood changes. The patient  "is not nervous/anxious.      MSK: patient complains of right knee 'creaking' while walking. Patient denies pain, but notices creaking while using the stairs, or when going from sitting to standing. She wants to exercise but is just getting over a knee injury and is hesitant. Injured it while exercising to a work out video. She uses a velcro brace, with a patella hole and support pad. The velcro irritates her thighs, discouraged from using it.      OBJECTIVE:   /72   Pulse 88   Temp 98.2  F (36.8  C) (Temporal)   Resp 14   Ht 1.725 m (5' 7.91\")   Wt 73 kg (161 lb)   LMP 02/22/2021 (Exact Date)   SpO2 97%   BMI 24.54 kg/m    Physical Exam  GENERAL: healthy, alert and no distress, sitting comfortably on exam table  EYES: Eyes grossly normal to inspection, PERRL and conjunctivae and sclerae normal  HENT: ear canals and TM's normal, nose and mouth without ulcers or lesions  NECK: no adenopathy, no asymmetry, masses, or scars and thyroid normal to palpation, no bruits  RESP: lungs clear to auscultation - no rales, rhonchi or wheezes  BREAST: normal without masses, tenderness or nipple discharge and no palpable axillary masses or adenopathy  CV: regular rate and rhythm, normal S1 S2, no S3 or S4, no murmur, click or rub, no peripheral edema and peripheral pulses strong  ABDOMEN: soft, nontender, no hepatosplenomegaly, no masses and bowel sounds normal   (female): normal female external genitalia, normal urethral meatus, vaginal mucosa pink, moist, well rugated, and normal cervix/adnexa/uterus without masses or discharge. PAP co-test completed.   MS: no gross musculoskeletal defects noted, mild edema noted on LE bilaterally. Mild joint effusion noted on right knee, some crepitus upon palpation.   SKIN: no suspicious lesions or rashes  NEURO: Normal strength and tone, mentation intact and speech normal  PSYCH: mentation appears normal, affect normal/bright    Diagnostic Test Results:  Orders Placed This " "Encounter   Procedures     TDAP VACCINE (Adacel, Boostrix)  [[id number]]     Pap imaged thin layer screen with HPV - recommended age 30 - 65 years (select HPV order below)     HPV High Risk Types DNA Cervical        ASSESSMENT/PLAN:       ICD-10-CM    1. Routine general medical examination at a health care facility  Z00.00    2. Screening for malignant neoplasm of cervix  Z12.4 Pap imaged thin layer screen with HPV - recommended age 30 - 65 years (select HPV order below)     HPV High Risk Types DNA Cervical   3. Need for vaccination  Z23 TDAP VACCINE (Adacel, Boostrix)  [[id number]]     -MSK: patient is encouraged to use compression socks on both legs, knee high, to encourage good blood flow in legs. She is encouraged to use compression knee brace, for support, as needed. Encouraged to continue exercising and strengthening her knee cautiously and educated about warning signs of more serious pathology when to be seen.      Patient has been advised of split billing requirements and indicates understanding: Yes  I recommend yearly physical, mammo every 1-2 years until 52, then yearly, pap every 3 years if normal.   Monthly SBE recommended.     COUNSELING:  Reviewed preventive health counseling, as reflected in patient instructions  Special attention given to:        Regular exercise       Healthy diet/nutrition       Vision screening       (Lianne)menopause management    Estimated body mass index is 24.54 kg/m  as calculated from the following:    Height as of this encounter: 1.725 m (5' 7.91\").    Weight as of this encounter: 73 kg (161 lb).        She reports that she quit smoking about 20 years ago. Her smoking use included cigarettes. She has a 10.00 pack-year smoking history. She has never used smokeless tobacco.      Counseling Resources:  ATP IV Guidelines  Pooled Cohorts Equation Calculator  Breast Cancer Risk Calculator  BRCA-Related Cancer Risk Assessment: FHS-7 Tool  FRAX Risk Assessment  ICSI Preventive " Guidelines  Dietary Guidelines for Americans, 2010  USDA's MyPlate  ASA Prophylaxis  Lung CA Screening    Micheline Baker, PA-S2  Pt was personally seen, interviewed and examined by me, chart notes edited and I agree with assessment as documented above.   Flori Acosta MD  [facility]

## 2021-02-25 LAB
COPATH REPORT: NORMAL
PAP: NORMAL

## 2021-02-26 LAB
FINAL DIAGNOSIS: NORMAL
HPV HR 12 DNA CVX QL NAA+PROBE: NEGATIVE
HPV16 DNA SPEC QL NAA+PROBE: NEGATIVE
HPV18 DNA SPEC QL NAA+PROBE: NEGATIVE
SPECIMEN DESCRIPTION: NORMAL
SPECIMEN SOURCE CVX/VAG CYTO: NORMAL

## 2021-03-09 ENCOUNTER — MYC MEDICAL ADVICE (OUTPATIENT)
Dept: FAMILY MEDICINE | Facility: CLINIC | Age: 46
End: 2021-03-09

## 2021-10-23 ENCOUNTER — HEALTH MAINTENANCE LETTER (OUTPATIENT)
Age: 46
End: 2021-10-23

## 2022-01-06 ENCOUNTER — MYC MEDICAL ADVICE (OUTPATIENT)
Dept: FAMILY MEDICINE | Facility: CLINIC | Age: 47
End: 2022-01-06
Payer: COMMERCIAL

## 2022-01-06 DIAGNOSIS — N63.0 LUMP OR MASS IN BREAST: Primary | ICD-10-CM

## 2022-01-07 NOTE — TELEPHONE ENCOUNTER
Message handled by Nurse Triage with [facility] - provider name: Dr. Acosta.    OK to order mammo [facility] so she can co-sign orders.  Patient informed via MyChart.  Closing this encounter.  Ronna Wakefield, RONAN, RN

## 2022-01-07 NOTE — TELEPHONE ENCOUNTER
See mychart note to patient. Needs appointment and needs diagnostic mammo right side with ultrasound.   Flori Acosta MD

## 2022-01-14 ENCOUNTER — HOSPITAL ENCOUNTER (OUTPATIENT)
Dept: ULTRASOUND IMAGING | Facility: CLINIC | Age: 47
End: 2022-01-14
Attending: FAMILY MEDICINE
Payer: COMMERCIAL

## 2022-01-14 ENCOUNTER — HOSPITAL ENCOUNTER (OUTPATIENT)
Dept: MAMMOGRAPHY | Facility: CLINIC | Age: 47
End: 2022-01-14
Attending: FAMILY MEDICINE
Payer: COMMERCIAL

## 2022-01-14 DIAGNOSIS — N63.0 LUMP OR MASS IN BREAST: ICD-10-CM

## 2022-01-14 PROCEDURE — 77062 BREAST TOMOSYNTHESIS BI: CPT

## 2022-01-14 PROCEDURE — 76642 ULTRASOUND BREAST LIMITED: CPT | Mod: 50

## 2022-01-18 NOTE — RESULT ENCOUNTER NOTE
Bhavana, I am happy to see that your mammogram and ultrasound show just some normal cysts there.  I will recommend another mammogram in 1 year.  Flori Acosta MD

## 2022-02-21 ENCOUNTER — OFFICE VISIT (OUTPATIENT)
Dept: FAMILY MEDICINE | Facility: CLINIC | Age: 47
End: 2022-02-21
Payer: COMMERCIAL

## 2022-02-21 VITALS
WEIGHT: 157.4 LBS | HEART RATE: 107 BPM | RESPIRATION RATE: 16 BRPM | BODY MASS INDEX: 24.71 KG/M2 | DIASTOLIC BLOOD PRESSURE: 78 MMHG | HEIGHT: 67 IN | TEMPERATURE: 97.7 F | SYSTOLIC BLOOD PRESSURE: 128 MMHG | OXYGEN SATURATION: 100 %

## 2022-02-21 DIAGNOSIS — Z23 NEED FOR COVID-19 VACCINE: ICD-10-CM

## 2022-02-21 DIAGNOSIS — Z00.00 ROUTINE GENERAL MEDICAL EXAMINATION AT A HEALTH CARE FACILITY: Primary | ICD-10-CM

## 2022-02-21 DIAGNOSIS — G89.29 CHRONIC PAIN OF RIGHT KNEE: ICD-10-CM

## 2022-02-21 DIAGNOSIS — M25.561 CHRONIC PAIN OF RIGHT KNEE: ICD-10-CM

## 2022-02-21 DIAGNOSIS — Z12.11 SPECIAL SCREENING FOR MALIGNANT NEOPLASMS, COLON: ICD-10-CM

## 2022-02-21 DIAGNOSIS — N60.01 BREAST CYST, RIGHT: ICD-10-CM

## 2022-02-21 PROCEDURE — 0064A COVID-19,PF,MODERNA (18+ YRS BOOSTER .25ML): CPT | Performed by: FAMILY MEDICINE

## 2022-02-21 PROCEDURE — 91306 COVID-19,PF,MODERNA (18+ YRS BOOSTER .25ML): CPT | Performed by: FAMILY MEDICINE

## 2022-02-21 PROCEDURE — 99396 PREV VISIT EST AGE 40-64: CPT | Performed by: FAMILY MEDICINE

## 2022-02-21 ASSESSMENT — ENCOUNTER SYMPTOMS
WEAKNESS: 0
JOINT SWELLING: 0
PARESTHESIAS: 0
BREAST MASS: 0
CONSTIPATION: 0
DIARRHEA: 0
HEMATOCHEZIA: 0
NAUSEA: 0
SORE THROAT: 0
COUGH: 0
EYE PAIN: 0
FREQUENCY: 0
SHORTNESS OF BREATH: 0
CHILLS: 0
DIZZINESS: 0
MYALGIAS: 0
PALPITATIONS: 0
HEARTBURN: 0
ARTHRALGIAS: 0
HEMATURIA: 0
HEADACHES: 0
DYSURIA: 0
NERVOUS/ANXIOUS: 0
ABDOMINAL PAIN: 0
FEVER: 0

## 2022-02-21 ASSESSMENT — PAIN SCALES - GENERAL: PAINLEVEL: NO PAIN (0)

## 2022-02-21 NOTE — PROGRESS NOTES
SUBJECTIVE:   CC: Bhavana Becker is an 46 year old woman who presents for preventive health visit.       Patient has been advised of split billing requirements and indicates understanding: Yes  Healthy Habits:     Getting at least 3 servings of Calcium per day:  Yes    Bi-annual eye exam:  Yes    Dental care twice a year:  Yes    Sleep apnea or symptoms of sleep apnea:  None    Diet:  Regular (no restrictions)    Frequency of exercise:  4-5 days/week    Duration of exercise:  15-30 minutes    Taking medications regularly:  Yes    Medication side effects:  None    PHQ-2 Total Score: 0    Additional concerns today:  No          Today's PHQ-2 Score:   PHQ-2 (  [employer]) 2022   Q1: Little interest or pleasure in doing things 0   Q2: Feeling down, depressed or hopeless 0   PHQ-2 Score 0   PHQ-2 Total Score (12-17 Years)- Positive if 3 or more points; Administer PHQ-A if positive -   Q1: Little interest or pleasure in doing things Not at all   Q2: Feeling down, depressed or hopeless Not at all   PHQ-2 Score 0       Abuse: Current or Past (Physical, Sexual or Emotional) - No  Do you feel safe in your environment? Yes        Social History     Tobacco Use     Smoking status: Former Smoker     Packs/day: 1.00     Years: 10.00     Pack years: 10.00     Types: Cigarettes     Quit date: 2000     Years since quittin.7     Smokeless tobacco: Never Used     Tobacco comment: quit smoking in .   Substance Use Topics     Alcohol use: Yes     Alcohol/week: 1.7 standard drinks     If you drink alcohol do you typically have >3 drinks per day or >7 drinks per week? No    Alcohol Use 2022   Prescreen: >3 drinks/day or >7 drinks/week? No   Prescreen: >3 drinks/day or >7 drinks/week? -   No flowsheet data found.    Reviewed orders with patient.  Reviewed health maintenance and updated orders accordingly - Yes  BP Readings from Last 3 Encounters:   22 128/78   21 118/72   21 122/82    Wt  Readings from Last 3 Encounters:   22 71.4 kg (157 lb 6.4 oz)   21 73 kg (161 lb)   21 75.1 kg (165 lb 9.6 oz)                  Patient Active Problem List   Diagnosis     CARDIOVASCULAR SCREENING; LDL GOAL LESS THAN 160     Past Surgical History:   Procedure Laterality Date     DILATION AND CURETTAGE, OPERATIVE HYSTEROSCOPY, COMBINED N/A 1/15/2015    removal of endometrial polyp       Social History     Tobacco Use     Smoking status: Former Smoker     Packs/day: 1.00     Years: 10.00     Pack years: 10.00     Types: Cigarettes     Quit date: 2000     Years since quittin.7     Smokeless tobacco: Never Used     Tobacco comment: quit smoking in .   Substance Use Topics     Alcohol use: Yes     Alcohol/week: 1.7 standard drinks     Family History   Problem Relation Age of Onset     Lipids Mother      Hypertension Mother      Arthritis Mother         age 63.     Circulatory Mother      Gynecology Mother         cyst on ovary removed at age 34     Heart Disease Mother         SOB, elev. BP  - believed to be MI, but confirmed no MI (RBBB)     Hyperlipidemia Mother      Gastrointestinal Disease Father         gallbladder removed at age 61     Lipids Father      Cerebrovascular Disease Father         X2       Heart Disease Father         pacemaker/ablation      Diabetes Father      Coronary Artery Disease Father      Hypertension Father      Hyperlipidemia Father      Cerebrovascular Disease Maternal Grandmother          of this at age 72     Cancer - colorectal Maternal Grandfather         at age 70's     Respiratory Maternal Grandfather         lung Cancer and empysema     Cancer Paternal Grandmother         brain cancer at age 80's.     Colon Cancer Paternal Grandfather            Breast Cancer Screening:    Breast CA Risk Assessment (FHS-7) 2022   Do you have a family history of breast, colon, or ovarian cancer? No / Unknown         Mammogram Screening - Offered  annual screening and updated Health Maintenance for mutual plan based on risk factor consideration, turning 47 in a few weeks    Pertinent mammograms are reviewed under the imaging tab.    History of abnormal Pap smear: NO - age 30- 65 PAP every 3 years recommended  PAP / HPV Latest Ref Rng & Units 2/23/2021 12/5/2017 11/7/2014   PAP (Historical) - NIL NIL NIL   HPV16 NEG:Negative Negative Negative -   HPV18 NEG:Negative Negative Negative -   HRHPV NEG:Negative Negative Negative -     Reviewed and updated as needed this visit by clinical staff   Tobacco  Allergies  Meds   Med Hx  Surg Hx  Fam Hx  Soc Hx        Reviewed and updated as needed this visit by Provider                     Review of Systems   Constitutional: Negative for chills and fever.   HENT: Negative for congestion, ear pain, hearing loss and sore throat.    Eyes: Negative for pain and visual disturbance.   Respiratory: Negative for cough and shortness of breath.    Cardiovascular: Negative for chest pain, palpitations and peripheral edema.   Gastrointestinal: Negative for abdominal pain, constipation, diarrhea, heartburn, hematochezia and nausea.   Breasts:  Negative for tenderness, breast mass and discharge.   Genitourinary: Negative for dysuria, frequency, genital sores, hematuria, pelvic pain, urgency, vaginal bleeding and vaginal discharge.   Musculoskeletal: Negative for arthralgias, joint swelling and myalgias.   Skin: Negative for rash.   Neurological: Negative for dizziness, weakness, headaches and paresthesias.   Psychiatric/Behavioral: Negative for mood changes. The patient is not nervous/anxious.    She got a cold about 3 weeks ago and still has a very slight lingering cough with some postnasal drainage.  She has had somewhat chronic right knee pain.  She has had a little bit of swelling in the right leg as well, and still has the swelling in the legs, right>left, and she tried wearing compression socks but they made her legs hurt  "too much. She is more active.  She has noticed it has gotten a little bit better but not gone away.    She was recently seen with a right breast mass and had mammogram and ultrasound that showed a benign-appearing cyst.  She knows it is still there, can feel it, and wondering if she should have something done about it or if it would likely go away on its own     OBJECTIVE:   /78   Pulse 107   Temp 97.7  F (36.5  C) (Temporal)   Resp 16   Ht 1.712 m (5' 7.4\")   Wt 71.4 kg (157 lb 6.4 oz)   LMP 02/14/2022   SpO2 100%   BMI 24.36 kg/m    Physical Exam  GENERAL: healthy, alert and no distress  EYES: Eyes grossly normal to inspection, PERRL and conjunctivae and sclerae normal  HENT: ear canals and TM's normal, nose and mouth without ulcers or lesions  NECK: no adenopathy, no asymmetry, masses, or scars and thyroid normal to palpation, no bruits.  RESP: lungs clear to auscultation - no rales, rhonchi or wheezes  BREAST: left breast normal without masses, tenderness or nipple discharge. Right breast reveals an obvious 1.5 cm cystic lesion at the right nipple, otherwise no masses or skin changes and no palpable axillary masses or adenopathy  CV: regular rate and rhythm, normal S1 S2, no S3 or S4, no murmur, click or rub  ABDOMEN: soft, nontender, no hepatosplenomegaly, no masses and bowel sounds normal  MS: no gross musculoskeletal defects noted, but her right leg is measuring bigger essentially from below the knee on down to the ankle.  Just slightly visible, slight edema on the right.  No obvious joint effusion in the knee.  Slight crepitus in the knees bilaterally.  Full range of motion.  SKIN: no suspicious lesions or rashes  NEURO: Normal strength and tone, mentation intact and speech normal  PSYCH: mentation appears normal, affect normal/bright    Diagnostic Test Results:  Orders Placed This Encounter   Procedures     REVIEW OF HEALTH MAINTENANCE PROTOCOL ORDERS     COVID-Franco,PF,MODERNA (18+ YRS BOOSTER " ".25ML)     Physical Therapy Referral     Adult Gastro Ref - Procedure Only        ASSESSMENT/PLAN:       ICD-10-CM    1. Routine general medical examination at a health care facility  Z00.00 REVIEW OF HEALTH MAINTENANCE PROTOCOL ORDERS   2. Chronic pain of right knee  M25.561 Physical Therapy Referral    G89.29    3. Need for COVID-19 vaccine  Z23 COVID-19,PF,MODERNA (18+ YRS BOOSTER .25ML)   4. Special screening for malignant neoplasms, colon  Z12.11 Adult Gastro Ref - Procedure Only       Patient has been advised of split billing requirements and indicates understanding: Yes    COUNSELING:  Reviewed preventive health counseling, as reflected in patient instructions  Special attention given to:        Regular exercise       Healthy diet/nutrition       Vision screening       Immunizations    Vaccinated for: covid booster             Contraception       Colorectal Cancer Screening    Estimated body mass index is 24.36 kg/m  as calculated from the following:    Height as of this encounter: 1.712 m (5' 7.4\").    Weight as of this encounter: 71.4 kg (157 lb 6.4 oz).        She reports that she quit smoking about 21 years ago. Her smoking use included cigarettes. She has a 10.00 pack-year smoking history. She has never used smokeless tobacco.      Counseling Resources:  ATP IV Guidelines  Pooled Cohorts Equation Calculator  Breast Cancer Risk Calculator  BRCA-Related Cancer Risk Assessment: FHS-7 Tool  FRAX Risk Assessment  ICSI Preventive Guidelines  Dietary Guidelines for Americans, 2010  USDA's MyPlate  ASA Prophylaxis  Lung CA Screening    Flori Acosta MD  [facility]  "

## 2022-02-22 PROBLEM — M25.561 CHRONIC PAIN OF RIGHT KNEE: Status: ACTIVE | Noted: 2022-02-22

## 2022-02-22 PROBLEM — N60.01 BREAST CYST, RIGHT: Status: ACTIVE | Noted: 2022-02-22

## 2022-02-22 PROBLEM — G89.29 CHRONIC PAIN OF RIGHT KNEE: Status: ACTIVE | Noted: 2022-02-22

## 2022-03-02 ENCOUNTER — THERAPY VISIT (OUTPATIENT)
Dept: PHYSICAL THERAPY | Facility: CLINIC | Age: 47
End: 2022-03-02
Attending: FAMILY MEDICINE
Payer: COMMERCIAL

## 2022-03-02 DIAGNOSIS — M25.561 CHRONIC PAIN OF RIGHT KNEE: ICD-10-CM

## 2022-03-02 DIAGNOSIS — G89.29 CHRONIC PAIN OF RIGHT KNEE: ICD-10-CM

## 2022-03-02 PROCEDURE — 97161 PT EVAL LOW COMPLEX 20 MIN: CPT | Mod: GP | Performed by: PHYSICAL THERAPIST

## 2022-03-02 PROCEDURE — 97110 THERAPEUTIC EXERCISES: CPT | Mod: GP | Performed by: PHYSICAL THERAPIST

## 2022-03-02 ASSESSMENT — ACTIVITIES OF DAILY LIVING (ADL)
SIT WITH YOUR KNEE BENT: ACTIVITY IS NOT DIFFICULT
KNEE_ACTIVITY_OF_DAILY_LIVING_SCORE: 90
PAIN: THE SYMPTOM AFFECTS MY ACTIVITY SLIGHTLY
STIFFNESS: THE SYMPTOM AFFECTS MY ACTIVITY SLIGHTLY
SWELLING: I HAVE THE SYMPTOM BUT IT DOES NOT AFFECT MY ACTIVITY
RAW_SCORE: 63
LIMPING: I DO NOT HAVE THE SYMPTOM
STAND: ACTIVITY IS MINIMALLY DIFFICULT
AS_A_RESULT_OF_YOUR_KNEE_INJURY,_HOW_WOULD_YOU_RATE_YOUR_CURRENT_LEVEL_OF_DAILY_ACTIVITY?: NEARLY NORMAL
KNEE_ACTIVITY_OF_DAILY_LIVING_SUM: 63
GO UP STAIRS: ACTIVITY IS NOT DIFFICULT
WALK: ACTIVITY IS NOT DIFFICULT
SQUAT: ACTIVITY IS NOT DIFFICULT
RISE FROM A CHAIR: ACTIVITY IS NOT DIFFICULT
HOW_WOULD_YOU_RATE_THE_OVERALL_FUNCTION_OF_YOUR_KNEE_DURING_YOUR_USUAL_DAILY_ACTIVITIES?: NEARLY NORMAL
KNEEL ON THE FRONT OF YOUR KNEE: ACTIVITY IS MINIMALLY DIFFICULT
HOW_WOULD_YOU_RATE_THE_CURRENT_FUNCTION_OF_YOUR_KNEE_DURING_YOUR_USUAL_DAILY_ACTIVITIES_ON_A_SCALE_FROM_0_TO_100_WITH_100_BEING_YOUR_LEVEL_OF_KNEE_FUNCTION_PRIOR_TO_YOUR_INJURY_AND_0_BEING_THE_INABILITY_TO_PERFORM_ANY_OF_YOUR_USUAL_DAILY_ACTIVITIES?: 90
WEAKNESS: I DO NOT HAVE THE SYMPTOM
GIVING WAY, BUCKLING OR SHIFTING OF KNEE: I DO NOT HAVE THE SYMPTOM
GO DOWN STAIRS: ACTIVITY IS NOT DIFFICULT

## 2022-03-02 NOTE — PROGRESS NOTES
Physical Therapy Initial Evaluation  Subjective:  The history is provided by the patient.   Patient Health History  Bhavana Becker being seen for Knee injury.     Problem began: 3/2/2020.   Problem occurred: Exercise   Pain is reported as 1/10 on pain scale.  General health as reported by patient is good.  Pertinent medical history includes: calf pain-swelling-warmth.     Medical allergies: none.   Surgeries include:  Other. Other surgery history details: Polyp removal uterus.    Current medications:  None.       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                  Therapist Generated HPI Evaluation  Problem details: March 2020 - Doing workout classes (step classes) at home during COVID.  Pain has improved now..         Type of problem:  Right knee.    This is a chronic condition.  Condition occurred with:  Repetition/overuse.  Where condition occurred: during recreation/sport.  Patient reports pain:  Anterior and sub patellar.  Pain is described as aching and is intermittent.  Pain is worse in the P.M..  Since onset symptoms are gradually improving.  Associated symptoms:  Loss of motion/stiffness. Symptoms are exacerbated by certain positions and kneeling  and relieved by rest.  Special tests included:  X-ray (Mild joint effusion. There may be a small articular body).    Restrictions due to condition include:  Working in normal job without restrictions.  Barriers include:  None as reported by patient.                        Objective:  Standing Alignment:              Knee:  Normal      Gait:    Gait Type:  Normal                                                      Hip Evaluation    Hip Strength:      Extension:  Right: 4+/5    Pain:    Abduction:  Right: 4+/5    Pain:  Adduction:  Right: 5/5   Pain:  Internal Rotation:  Right: 5/5   Pain:  External Rotation:  Right: 5/5   Pain:  Knee Flexion:  Right: 5/5   Pain:  Knee Extension:  Right: 5/5    Pain:                 Knee  Evaluation:  ROM:  AROM: normal (end range tightness on R with flexion)  PROM: normal              Ligament Testing:  Normal                Special Tests:     Right knee positive for the following tests:  Patellar Compression (mild)  Right knee negative for the following special tests:  Meniscal  Palpation:      Right knee tenderness present at:  IT Band and Patellar Lateral  Right knee tenderness not present at:  Medial Joint Line; Lateral Joint Line and Patellar Tendon  Edema:  Normal            General     ROS    Assessment/Plan:    Patient is a 47 year old female with right side knee complaints.    Patient has the following significant findings with corresponding treatment plan.                Diagnosis 1:  R knee pain  Pain -  self management, education, directional preference exercise and home program  Decreased ROM/flexibility - manual therapy and therapeutic exercise  Decreased strength - therapeutic exercise and therapeutic activities  Impaired muscle performance - neuro re-education  Decreased function - therapeutic activities    Therapy Evaluation Codes:   1) History comprised of:   Personal factors that impact the plan of care:      None.    Comorbidity factors that impact the plan of care are:      None.     Medications impacting care: None.  2) Examination of Body Systems comprised of:   Body structures and functions that impact the plan of care:      Knee.   Activity limitations that impact the plan of care are:      Squatting/kneeling.  3) Clinical presentation characteristics are:   Stable/Uncomplicated.  4) Decision-Making    Low complexity using standardized patient assessment instrument and/or measureable assessment of functional outcome.  Cumulative Therapy Evaluation is: Low complexity.    Previous and current functional limitations:  (See Goal Flow Sheet for this information)    Short term and Long term goals: (See Goal Flow Sheet for this information)     Communication ability:  Patient  appears to be able to clearly communicate and understand verbal and written communication and follow directions correctly.  Treatment Explanation - The following has been discussed with the patient:   RX ordered/plan of care  Anticipated outcomes  Possible risks and side effects  This patient would benefit from PT intervention to resume normal activities.   Rehab potential is good.    Frequency:  2 X a month, once daily  Duration:  for 2 months  Discharge Plan:  Achieve all LTG.  Independent in home treatment program.  Reach maximal therapeutic benefit.    Please refer to the daily flowsheet for treatment today, total treatment time and time spent performing 1:1 timed codes.

## 2022-03-18 ENCOUNTER — THERAPY VISIT (OUTPATIENT)
Dept: PHYSICAL THERAPY | Facility: CLINIC | Age: 47
End: 2022-03-18
Attending: FAMILY MEDICINE
Payer: COMMERCIAL

## 2022-03-18 DIAGNOSIS — G89.29 CHRONIC PAIN OF RIGHT KNEE: ICD-10-CM

## 2022-03-18 DIAGNOSIS — M25.561 CHRONIC PAIN OF RIGHT KNEE: ICD-10-CM

## 2022-03-18 PROCEDURE — 97110 THERAPEUTIC EXERCISES: CPT | Mod: GP | Performed by: PHYSICAL THERAPIST

## 2022-03-18 PROCEDURE — 97140 MANUAL THERAPY 1/> REGIONS: CPT | Mod: GP | Performed by: PHYSICAL THERAPIST

## 2022-04-08 ENCOUNTER — THERAPY VISIT (OUTPATIENT)
Dept: PHYSICAL THERAPY | Facility: CLINIC | Age: 47
End: 2022-04-08
Payer: COMMERCIAL

## 2022-04-08 DIAGNOSIS — G89.29 CHRONIC PAIN OF RIGHT KNEE: ICD-10-CM

## 2022-04-08 DIAGNOSIS — M25.561 CHRONIC PAIN OF RIGHT KNEE: ICD-10-CM

## 2022-04-08 PROCEDURE — 97110 THERAPEUTIC EXERCISES: CPT | Mod: GP | Performed by: PHYSICAL THERAPIST

## 2022-04-08 PROCEDURE — 97112 NEUROMUSCULAR REEDUCATION: CPT | Mod: GP | Performed by: PHYSICAL THERAPIST

## 2022-04-22 ENCOUNTER — THERAPY VISIT (OUTPATIENT)
Dept: PHYSICAL THERAPY | Facility: CLINIC | Age: 47
End: 2022-04-22
Payer: COMMERCIAL

## 2022-04-22 DIAGNOSIS — G89.29 CHRONIC PAIN OF RIGHT KNEE: Primary | ICD-10-CM

## 2022-04-22 DIAGNOSIS — M25.561 CHRONIC PAIN OF RIGHT KNEE: Primary | ICD-10-CM

## 2022-04-22 PROCEDURE — 97112 NEUROMUSCULAR REEDUCATION: CPT | Mod: GP | Performed by: PHYSICAL THERAPIST

## 2022-04-22 PROCEDURE — 97110 THERAPEUTIC EXERCISES: CPT | Mod: GP | Performed by: PHYSICAL THERAPIST

## 2022-04-22 NOTE — PROGRESS NOTES
"Subjective:  HPI  Physical Exam                    Objective:  System    Physical Exam    General     ROS    Assessment/Plan:    PROGRESS  REPORT    Progress reporting period is from 3/2/2022 to 4/22/2022.       SUBJECTIVE  Subjective changes noted by patient:  .  Subjective: Reports still feeling tight and swollen but states she thinks it looks better.    Current pain level is 0/10 Current Pain level: 0/10.     Previous pain level was  0/10 Initial Pain level: 1/10.   Changes in function:  Yes (See Goal flowsheet attached for changes in current functional level)  Adverse reaction to treatment or activity: None    OBJECTIVE  Changes noted in objective findings:  Yes,   Objective: ROM: Flexion L 140, Flexion R 136, pain with 4\" ant step down, single limb squat assessment: anterior translation of knee, mod knee valgus     ASSESSMENT/PLAN  Updated problem list and treatment plan: Diagnosis 1:  Chronic pain of right knee   Decreased strength - therapeutic exercise, therapeutic activities and home program  Decreased function - therapeutic activities and home program  STG/LTGs have been met or progress has been made towards goals:  Yes (See Goal flow sheet completed today.)  Assessment of Progress: The patient's condition is improving.  Self Management Plans:  Patient has been instructed in a home treatment program.  Patient  has been instructed in self management of symptoms.  I have re-evaluated this patient and find that the nature, scope, duration and intensity of the therapy is appropriate for the medical condition of the patient.  Bhavana continues to require the following intervention to meet STG and LTG's:  PT    Recommendations:  This patient would benefit from continued therapy.     Frequency:  1 X week, once daily  Duration:  for 3 weeks        Please refer to the daily flowsheet for treatment today, total treatment time and time spent performing 1:1 timed codes.    Aakash Ortega SPT; Howard Espinosa PT        "

## 2022-05-05 ENCOUNTER — THERAPY VISIT (OUTPATIENT)
Dept: PHYSICAL THERAPY | Facility: CLINIC | Age: 47
End: 2022-05-05
Payer: COMMERCIAL

## 2022-05-05 DIAGNOSIS — G89.29 CHRONIC PAIN OF RIGHT KNEE: Primary | ICD-10-CM

## 2022-05-05 DIAGNOSIS — M25.561 CHRONIC PAIN OF RIGHT KNEE: Primary | ICD-10-CM

## 2022-05-05 PROCEDURE — 97110 THERAPEUTIC EXERCISES: CPT | Mod: GP | Performed by: PHYSICAL THERAPIST

## 2022-05-05 PROCEDURE — 97112 NEUROMUSCULAR REEDUCATION: CPT | Mod: GP | Performed by: PHYSICAL THERAPIST

## 2022-05-05 ASSESSMENT — ACTIVITIES OF DAILY LIVING (ADL)
HOW_WOULD_YOU_RATE_THE_OVERALL_FUNCTION_OF_YOUR_KNEE_DURING_YOUR_USUAL_DAILY_ACTIVITIES?: NEARLY NORMAL
RISE FROM A CHAIR: ACTIVITY IS NOT DIFFICULT
GO UP STAIRS: ACTIVITY IS NOT DIFFICULT
PAIN: I DO NOT HAVE THE SYMPTOM
KNEEL ON THE FRONT OF YOUR KNEE: ACTIVITY IS NOT DIFFICULT
RAW_SCORE: 67
WEAKNESS: I HAVE THE SYMPTOM BUT IT DOES NOT AFFECT MY ACTIVITY
HOW_WOULD_YOU_RATE_THE_CURRENT_FUNCTION_OF_YOUR_KNEE_DURING_YOUR_USUAL_DAILY_ACTIVITIES_ON_A_SCALE_FROM_0_TO_100_WITH_100_BEING_YOUR_LEVEL_OF_KNEE_FUNCTION_PRIOR_TO_YOUR_INJURY_AND_0_BEING_THE_INABILITY_TO_PERFORM_ANY_OF_YOUR_USUAL_DAILY_ACTIVITIES?: 95
STAND: ACTIVITY IS NOT DIFFICULT
LIMPING: I DO NOT HAVE THE SYMPTOM
SIT WITH YOUR KNEE BENT: ACTIVITY IS NOT DIFFICULT
GIVING WAY, BUCKLING OR SHIFTING OF KNEE: I DO NOT HAVE THE SYMPTOM
AS_A_RESULT_OF_YOUR_KNEE_INJURY,_HOW_WOULD_YOU_RATE_YOUR_CURRENT_LEVEL_OF_DAILY_ACTIVITY?: NORMAL
SWELLING: I DO NOT HAVE THE SYMPTOM
STIFFNESS: I HAVE THE SYMPTOM BUT IT DOES NOT AFFECT MY ACTIVITY
KNEE_ACTIVITY_OF_DAILY_LIVING_SCORE: 95.71
SQUAT: ACTIVITY IS MINIMALLY DIFFICULT
WALK: ACTIVITY IS NOT DIFFICULT
GO DOWN STAIRS: ACTIVITY IS NOT DIFFICULT
KNEE_ACTIVITY_OF_DAILY_LIVING_SUM: 67

## 2022-05-05 NOTE — PROGRESS NOTES
"Subjective:  HPI  Physical Exam       Knee Activity of Daily Living Score: 95.71            Objective:  System    Physical Exam    General     ROS    Assessment/Plan:    DISCHARGE REPORT    Progress reporting period is from 4/22/2022 to 5/5/2022.       SUBJECTIVE  Subjective changes noted by patient:.  Subjective: Still has moments of feeling tight, normally when the leg is locked out straight. Feels like theres less swelling. Tried doing squats this morning with lots of \"creaking\" in the knee but didn't do enough to cause pain.    Current pain level is 0/10 Current Pain level: 0/10.     Previous pain level was  1/10 Initial Pain level: 1/10.   Changes in function:  Yes (See Goal flowsheet attached for changes in current functional level)  Adverse reaction to treatment or activity: None    OBJECTIVE  Changes noted in objective findings:  Yes,  Objective: SL squat to plinth: 1x8 each leg- good control L leg, mild valgus and pain R leg     ASSESSMENT/PLAN  Updated problem list and treatment plan: Diagnosis 1:  Right Knee Pain Decreased strength - home program  STG/LTGs have been met or progress has been made towards goals:  Yes (See Goal flow sheet completed today.)  Assessment of Progress: The patient has met all of their long term goals.  Self Management Plans:  Patient is independent in a home treatment program.  Patient is independent in self management of symptoms.  I have re-evaluated this patient and find that the nature, scope, duration and intensity of the therapy is appropriate for the medical condition of the patient.  Bhavana continues to require the following intervention to meet STG and LTG's:  PT intervention is no longer required to meet STG/LTG.    Recommendations:  This patient is ready to be discharged from therapy and continue their home treatment program.    Aakash Ortega SPT; Howard Espinosa PT    Please refer to the daily flowsheet for treatment today, total treatment time and time spent " performing 1:1 timed codes.

## 2022-10-09 ENCOUNTER — HEALTH MAINTENANCE LETTER (OUTPATIENT)
Age: 47
End: 2022-10-09

## 2022-12-13 ENCOUNTER — TELEPHONE (OUTPATIENT)
Dept: GASTROENTEROLOGY | Facility: CLINIC | Age: 47
End: 2022-12-13

## 2022-12-13 ENCOUNTER — MYC MEDICAL ADVICE (OUTPATIENT)
Dept: FAMILY MEDICINE | Facility: CLINIC | Age: 47
End: 2022-12-13

## 2022-12-13 DIAGNOSIS — Z12.11 SPECIAL SCREENING FOR MALIGNANT NEOPLASMS, COLON: Primary | ICD-10-CM

## 2022-12-13 NOTE — TELEPHONE ENCOUNTER
Patient scheduled colonoscopy for after referral will . New referral pended for review.     Delmis Hoffman, RONAN, RN

## 2022-12-13 NOTE — TELEPHONE ENCOUNTER
Screening Questions  BLUE  KIND OF PREP RED  LOCATION [review exclusion criteria] GREEN  SEDATION TYPE        Y Are you active on mychart?       Flori Acosta MD in [facility] Ordering/Referring Provider?        MEDICA/CIGNA What type of coverage do you have?      N Have you had a positive covid test in the last 14 days?     23.2 1. BMI  [BMI 40+ - review exclusion criteria]    Y  2. Are you able to give consent for your medical care? [IF NO,RN REVIEW]        N  3. Are you taking any prescription pain medications on a routine schedule?        3a. EXTENDED PREP What kind of prescription?     N 4. Do you have any chemical dependencies such as alcohol, street drugs, or methadone?    N 5. Do you have any history of post-traumatic stress syndrome, severe anxiety or history of psychosis?      **If yes 3- 5 , please schedule with MAC sedation.**          IF YES TO ANY 6 - 10 - HOSPITAL SETTING ONLY.     N 6.   Do you need assistance transferring?     N 7.   Have you had a heart or lung transplant?    N 8.   Are you currently on dialysis?   N 9.   Do you use daily home oxygen?   N 10. Do you take nitroglycerin?   10a.  If yes, how often?     11. [FEMALES]  N Are you currently pregnant?    11a.  If yes, how many weeks? [ Greater than 12 weeks, OR NEEDED]    N 12. Do you have Pulmonary Hypertension? *NEED PAC APPT AT [facility]*     N 13. [review exclusion criteria]  Do you have any implantable devices in your body (pacemaker, defib, LVAD)?    N 14. In the past 6 months, have you had any heart related issues including cardiomyopathy or heart attack?     14a.  If yes, did it require cardiac stenting if so when?     N 15. Have you had a stroke or Transient ischemic attack (TIA - aka  mini stroke ) within 6 months?      N 16. Do you have mod to severe Obstructive Sleep Apnea?  [Hospital only - Ok at [facility]]    N 17. Do you have SEVERE AND UNCONTROLLED asthma? *NEED PAC APPT AT [facility]*     N 18. Are you  "currently taking any blood thinners?     18a. If yes, inform patient to \"follow up w/ ordering provider for bridging instructions.\"    N 19. Do you take the medication Phentermine?    19a. If yes, \"Hold for 7 days before procedure.  Please consult your prescribing provider if you have questions about holding this medication.\"     N  20. Do you have chronic kidney disease?      N  21. Do you have a diagnosis of diabetes?     N  22. On a regular basis do you go 3-5 days between bowel movements?      23. Preferred LOCAL Pharmacy for Pre Prescription    [ LIST ONLY ONE PHARMACY]        [facility] [address] [place] [address]        - CLOSING REMINDERS -    Informed patient they will need an adult    Cannot take any type of public or medical transportation alone    Conscious Sedation- Needs  for 6 hours after the procedure       MAC/General-Needs  for 24 hours after procedure    Pre-Procedure Covid test to be completed [[facility] PCR Testing Required]    Confirmed Nurse will call to complete assessment       - SCHEDULING DETAILS -  No Hospital Setting Required? If yes, what is the exclusion?: n/a   Stephon  Surgeon    02/27/23  Date of Procedure  Lower Endoscopy [Colonoscopy]  Type of Procedure Scheduled  [facility]   STANDARD GOLYTELY-If you answer yes to questions #8, #20, #21Which Colonoscopy Prep was Sent?     MAC Sedation Type     N PAC / Pre-op Required                 "

## 2023-01-16 ENCOUNTER — HOSPITAL ENCOUNTER (OUTPATIENT)
Dept: MAMMOGRAPHY | Facility: CLINIC | Age: 48
Discharge: HOME | End: 2023-01-16
Attending: FAMILY MEDICINE | Admitting: FAMILY MEDICINE
Payer: COMMERCIAL

## 2023-01-16 DIAGNOSIS — Z12.31 VISIT FOR SCREENING MAMMOGRAM: ICD-10-CM

## 2023-01-16 PROCEDURE — 77067 SCR MAMMO BI INCL CAD: CPT

## 2023-02-27 ENCOUNTER — ANESTHESIA (OUTPATIENT)
Dept: GASTROENTEROLOGY | Facility: CLINIC | Age: 48
End: 2023-02-27
Payer: COMMERCIAL

## 2023-02-27 ENCOUNTER — HOSPITAL ENCOUNTER (OUTPATIENT)
Facility: CLINIC | Age: 48
Discharge: HOME | End: 2023-02-27
Attending: FAMILY MEDICINE | Admitting: FAMILY MEDICINE
Payer: COMMERCIAL

## 2023-02-27 ENCOUNTER — ANESTHESIA EVENT (OUTPATIENT)
Dept: GASTROENTEROLOGY | Facility: CLINIC | Age: 48
End: 2023-02-27
Payer: COMMERCIAL

## 2023-02-27 VITALS
DIASTOLIC BLOOD PRESSURE: 72 MMHG | TEMPERATURE: 98.7 F | SYSTOLIC BLOOD PRESSURE: 117 MMHG | RESPIRATION RATE: 18 BRPM | OXYGEN SATURATION: 100 % | HEART RATE: 76 BPM

## 2023-02-27 LAB — COLONOSCOPY: NORMAL

## 2023-02-27 PROCEDURE — 258N000003 HC RX IP 258 OP 636: Performed by: NURSE ANESTHETIST, CERTIFIED REGISTERED

## 2023-02-27 PROCEDURE — 250N000009 HC RX 250: Performed by: NURSE ANESTHETIST, CERTIFIED REGISTERED

## 2023-02-27 PROCEDURE — 45378 DIAGNOSTIC COLONOSCOPY: CPT | Performed by: FAMILY MEDICINE

## 2023-02-27 PROCEDURE — 250N000011 HC RX IP 250 OP 636: Performed by: NURSE ANESTHETIST, CERTIFIED REGISTERED

## 2023-02-27 PROCEDURE — G0121 COLON CA SCRN NOT HI RSK IND: HCPCS | Performed by: FAMILY MEDICINE

## 2023-02-27 PROCEDURE — 370N000017 HC ANESTHESIA TECHNICAL FEE, PER MIN: Performed by: FAMILY MEDICINE

## 2023-02-27 RX ORDER — NALOXONE HYDROCHLORIDE 0.4 MG/ML
0.2 INJECTION, SOLUTION INTRAMUSCULAR; INTRAVENOUS; SUBCUTANEOUS
Status: DISCONTINUED | OUTPATIENT
Start: 2023-02-27 | End: 2023-02-27 | Stop reason: HOSPADM

## 2023-02-27 RX ORDER — NALOXONE HYDROCHLORIDE 0.4 MG/ML
0.4 INJECTION, SOLUTION INTRAMUSCULAR; INTRAVENOUS; SUBCUTANEOUS
Status: DISCONTINUED | OUTPATIENT
Start: 2023-02-27 | End: 2023-02-27 | Stop reason: HOSPADM

## 2023-02-27 RX ORDER — SODIUM CHLORIDE, SODIUM LACTATE, POTASSIUM CHLORIDE, CALCIUM CHLORIDE 600; 310; 30; 20 MG/100ML; MG/100ML; MG/100ML; MG/100ML
INJECTION, SOLUTION INTRAVENOUS CONTINUOUS
Status: DISCONTINUED | OUTPATIENT
Start: 2023-02-27 | End: 2023-02-27 | Stop reason: HOSPADM

## 2023-02-27 RX ORDER — LIDOCAINE HYDROCHLORIDE 20 MG/ML
INJECTION, SOLUTION INFILTRATION; PERINEURAL PRN
Status: DISCONTINUED | OUTPATIENT
Start: 2023-02-27 | End: 2023-02-27

## 2023-02-27 RX ORDER — PROPOFOL 10 MG/ML
INJECTION, EMULSION INTRAVENOUS PRN
Status: DISCONTINUED | OUTPATIENT
Start: 2023-02-27 | End: 2023-02-27

## 2023-02-27 RX ORDER — FLUMAZENIL 0.1 MG/ML
0.2 INJECTION, SOLUTION INTRAVENOUS
Status: DISCONTINUED | OUTPATIENT
Start: 2023-02-27 | End: 2023-02-27 | Stop reason: HOSPADM

## 2023-02-27 RX ORDER — ONDANSETRON 2 MG/ML
4 INJECTION INTRAMUSCULAR; INTRAVENOUS EVERY 6 HOURS PRN
Status: DISCONTINUED | OUTPATIENT
Start: 2023-02-27 | End: 2023-02-27 | Stop reason: HOSPADM

## 2023-02-27 RX ORDER — ONDANSETRON 4 MG/1
4 TABLET, ORALLY DISINTEGRATING ORAL EVERY 6 HOURS PRN
Status: DISCONTINUED | OUTPATIENT
Start: 2023-02-27 | End: 2023-02-27 | Stop reason: HOSPADM

## 2023-02-27 RX ORDER — PROCHLORPERAZINE MALEATE 5 MG
10 TABLET ORAL EVERY 6 HOURS PRN
Status: DISCONTINUED | OUTPATIENT
Start: 2023-02-27 | End: 2023-02-27 | Stop reason: HOSPADM

## 2023-02-27 RX ORDER — ONDANSETRON 2 MG/ML
4 INJECTION INTRAMUSCULAR; INTRAVENOUS
Status: DISCONTINUED | OUTPATIENT
Start: 2023-02-27 | End: 2023-02-27

## 2023-02-27 RX ORDER — LIDOCAINE 40 MG/G
CREAM TOPICAL
Status: DISCONTINUED | OUTPATIENT
Start: 2023-02-27 | End: 2023-02-27 | Stop reason: HOSPADM

## 2023-02-27 RX ORDER — PROPOFOL 10 MG/ML
INJECTION, EMULSION INTRAVENOUS CONTINUOUS PRN
Status: DISCONTINUED | OUTPATIENT
Start: 2023-02-27 | End: 2023-02-27

## 2023-02-27 RX ADMIN — SODIUM CHLORIDE, POTASSIUM CHLORIDE, SODIUM LACTATE AND CALCIUM CHLORIDE: 600; 310; 30; 20 INJECTION, SOLUTION INTRAVENOUS at 08:44

## 2023-02-27 RX ADMIN — PROPOFOL 100 MG: 10 INJECTION, EMULSION INTRAVENOUS at 09:39

## 2023-02-27 RX ADMIN — PROPOFOL 100 MG: 10 INJECTION, EMULSION INTRAVENOUS at 09:42

## 2023-02-27 RX ADMIN — PROPOFOL 50 MG: 10 INJECTION, EMULSION INTRAVENOUS at 09:45

## 2023-02-27 RX ADMIN — LIDOCAINE HYDROCHLORIDE 50 MG: 20 INJECTION, SOLUTION INFILTRATION; PERINEURAL at 09:39

## 2023-02-27 RX ADMIN — PROPOFOL 150 MCG/KG/MIN: 10 INJECTION, EMULSION INTRAVENOUS at 09:39

## 2023-02-27 RX ADMIN — PROPOFOL 50 MG: 10 INJECTION, EMULSION INTRAVENOUS at 09:48

## 2023-02-27 RX ADMIN — LIDOCAINE HYDROCHLORIDE 1 ML: 10 INJECTION, SOLUTION EPIDURAL; INFILTRATION; INTRACAUDAL; PERINEURAL at 08:44

## 2023-02-27 ASSESSMENT — LIFESTYLE VARIABLES: TOBACCO_USE: 1

## 2023-02-27 ASSESSMENT — ACTIVITIES OF DAILY LIVING (ADL): ADLS_ACUITY_SCORE: 35

## 2023-02-27 NOTE — ANESTHESIA CARE TRANSFER NOTE
Patient: Bhavana Becker    Procedure: Procedure(s):  COLONOSCOPY       Diagnosis: Special screening for malignant neoplasms, colon [Z12.11]  Diagnosis Additional Information: No value filed.    Anesthesia Type:   MAC     Note:    Oropharynx: oropharynx clear of all foreign objects and spontaneously breathing  Level of Consciousness: drowsy  Oxygen Supplementation: face mask    Independent Airway: airway patency satisfactory and stable  Dentition: dentition unchanged  Vital Signs Stable: post-procedure vital signs reviewed and stable  Report to RN Given: handoff report given  Patient transferred to: Phase II    Handoff Report: Identifed the Patient, Identified the Reponsible Provider, Reviewed the pertinent medical history, Discussed the surgical course, Reviewed Intra-OP anesthesia mangement and issues during anesthesia, Set expectations for post-procedure period and Allowed opportunity for questions and acknowledgement of understanding      Vitals:  Vitals Value Taken Time   BP     Temp     Pulse     Resp     SpO2         Electronically Signed By: HERBERT Treviño CRNA  February 27, 2023  9:56 AM

## 2023-02-27 NOTE — H&P
"Pre-Endoscopy History and Physical     Bhavana Becker MRN# [id number]   YOB: 1975 Age: 47 year old     Date of Procedure: 2/27/2023  Primary care provider: Flori Acosta  Type of Endoscopy: colonoscopy  Type of Anesthesia Anticipated: MAC     HPI:    Bhavana is a 47 year old female who was referred to me for colonoscopy.    Bhavana is feeling well today. We discussed today's colonoscopy in the pre-op area.    Patient Active Problem List   Diagnosis     CARDIOVASCULAR SCREENING; LDL GOAL LESS THAN 160     Breast cyst, right          /83   Temp 98.7  F (37.1  C) (Oral)   Resp 18   SpO2 100%    Estimated body mass index is 24.36 kg/m  as calculated from the following:    Height as of 2/21/22: 1.712 m (5' 7.4\").    Weight as of 2/21/22: 71.4 kg (157 lb 6.4 oz).    GENERAL APPEARANCE: alert and oriented and NAD  See anesthesia exam      Assessment/Plan   ASA Class 1 - Healthy patient, no medical problems    Plan for colonoscopy. No medical contraindications to proceed, or further work up needed. The risks and benefits of the procedure and the sedation options and risks were discussed with the patient. These include infection, bleeding, and small risk of colon perforation (1/1000 to 1/20330 depending on patient characteristics and type of procedure). Bhavana was also explained to alternatives for colo-rectal screening. All questions were answered and informed consent was obtained.      Thank you kindly for the referral and opportunity to provide CRC screening      Signed Electronically by: Lam Espinoza MD  February 27, 2023     "

## 2023-02-27 NOTE — DISCHARGE INSTRUCTIONS
[facility]    Home Care Following Endoscopy          Activity:  You have just undergone an endoscopic procedure usually performed with conscious sedation.  Do not work or operate machinery (including a car) for at least 12 hours.    I encourage you to walk and attempt to pass this air as soon as possible.    Diet:  Return to the diet you were on before your procedure but eat lightly for the first 12-24 hours.  Drink plenty of water.  Resume any regular medications unless otherwise advised by your physician.  Please begin any new medication prescribed as a result of your procedure as directed by your physician.   If you had any biopsy or polyp removed please refrain from aspirin or aspirin products for 2 days.  If on Coumadin please restart as instructed by your physician.   Pain:  You may take Tylenol as needed for pain.  Expected Recovery:  You can expect some mild abdominal fullness and/or discomfort due to the air used to inflate your intestinal tract. It is also normal to have a mild sore throat after upper endoscopy.    Call Your Physician if You Have:  After Colonoscopy:  Worsening persisting abdominal pain which is worse with activity.  Fevers (>101 degrees F), chills or shakes.  Passage of continued blood with bowel movements.   Any questions or concerns about your recovery, please call [phone] or after hours [phone] Nurse Advice Line.    Follow-up Care:  You did have polyps/biopsy tissue sample(s) removed.  The polyps/biopsy tissue sample(s) will be sent to pathology.  You should receive letter in your My Chart from Dr Espinoza with your results within 1-2 weeks. If you do not participate in My Chart a physical letter will come in the mail in 2-3 weeks.  Please call if you have not received a notification of your results.

## 2023-02-27 NOTE — ANESTHESIA PREPROCEDURE EVALUATION
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 06    10weeks     Hypertension goal BP (blood pressure) < 140/90       Past Surgical History:   Procedure Laterality Date     DILATION AND CURETTAGE, OPERATIVE HYSTEROSCOPY, COMBINED N/A 1/15/2015    removal of endometrial polyp      Allergies   Allergen Reactions     Codeine      Propoxyphene      Darvocet     Sulfa Drugs Other (See Comments)     Corneal ulcer from eye drops      Social History     Tobacco Use     Smoking status: Former     Packs/day: 1.00     Years: 10.00     Pack years: 10.00     Types: Cigarettes     Quit date: 2000     Years since quittin.7     Smokeless tobacco: Never     Tobacco comments:     quit smoking in .   Substance Use Topics     Alcohol use: Yes     Alcohol/week: 1.7 standard drinks      Wt Readings from Last 1 Encounters:   22 71.4 kg (157 lb 6.4 oz)        Anesthesia Evaluation   Pt has had prior anesthetic. Type: MAC.        ROS/MED HX  ENT/Pulmonary:     (+) tobacco use, Past use,     Neurologic:  - neg neurologic ROS     Cardiovascular:     (+) Dyslipidemia hypertension-----    METS/Exercise Tolerance:     Hematologic:  - neg hematologic  ROS     Musculoskeletal:  - neg musculoskeletal ROS     GI/Hepatic:     (+) bowel prep,     Renal/Genitourinary:  - neg Renal ROS     Endo:  - neg endo ROS     Psychiatric/Substance Use:  - neg psychiatric ROS     Infectious Disease:  - neg infectious disease ROS     Malignancy:  - neg malignancy ROS     Other:            Physical Exam    Airway        Mallampati: II   TM distance: > 3 FB   Neck ROM: full   Mouth opening: > 3 cm    Respiratory Devices and Support         Dental           Cardiovascular   cardiovascular exam normal          Pulmonary   pulmonary exam  normal                OUTSIDE LABS:  CBC:   Lab Results   Component Value Date    WBC 7.7 01/21/2015    WBC 8.3 11/07/2014    HGB 14.0 02/17/2020    HGB 13.8 12/07/2018    HCT 41.3 01/21/2015    HCT 42.2 11/07/2014     01/21/2015     11/07/2014     BMP:   Lab Results   Component Value Date     09/22/2010     08/30/2010    POTASSIUM 4.1 09/22/2010    POTASSIUM 4.4 08/30/2010    CHLORIDE 105 09/22/2010    CHLORIDE 108 08/30/2010    CO2 23 09/22/2010    CO2 24 08/30/2010    BUN 15 09/22/2010    BUN 15 08/30/2010    CR 0.82 09/22/2010    CR 0.71 09/01/2010    GLC 93 11/16/2015    GLC 81 09/22/2010     COAGS:   Lab Results   Component Value Date    PTT 33 09/01/2010    INR 1.01 09/01/2010     POC:   Lab Results   Component Value Date    HCG Negative 01/15/2015     HEPATIC:   Lab Results   Component Value Date    ALBUMIN 4.8 09/22/2010    PROTTOTAL 7.8 09/22/2010    ALT 54 (H) 09/22/2010    AST 40 09/22/2010    ALKPHOS 112 09/22/2010    BILITOTAL 0.3 09/22/2010     OTHER:   Lab Results   Component Value Date    JO-ANN 9.7 09/22/2010    PHOS 3.7 08/30/2010    MAG 2.0 08/30/2010    LIPASE 44 08/30/2010    TSH 1.06 02/17/2020       Anesthesia Plan    ASA Status:  2   NPO Status:  NPO Appropriate    Anesthesia Type: MAC.     - Reason for MAC: straight local not clinically adequate   Induction: Intravenous, Propofol.   Maintenance: TIVA.        Consents    Anesthesia Plan(s) and associated risks, benefits, and realistic alternatives discussed. Questions answered and patient/representative(s) expressed understanding.     - Discussed: Risks, Benefits and Alternatives for BOTH SEDATION and the PROCEDURE were discussed     - Discussed with:  Patient      - Extended Intubation/Ventilatory Support Discussed: No.      - Patient is DNR/DNI Status: No    Use of blood products discussed: No .     Postoperative Care            Comments:    Other Comments: The risks and benefits of anesthesia, and the alternatives  where applicable, have been discussed with the patient, and they wish to proceed.            HERBERT Treviño CRNA

## 2023-02-27 NOTE — ANESTHESIA POSTPROCEDURE EVALUATION
Patient: Bhavana Becker    Procedure: Procedure(s):  COLONOSCOPY       Anesthesia Type:  MAC    Note:  Disposition: Outpatient   Postop Pain Control: Uneventful            Sign Out: Well controlled pain   PONV: No   Neuro/Psych: Uneventful            Sign Out: Acceptable/Baseline neuro status   Airway/Respiratory: Uneventful            Sign Out: Acceptable/Baseline resp. status   CV/Hemodynamics: Uneventful            Sign Out: Acceptable CV status   Other NRE: NONE   DID A NON-ROUTINE EVENT OCCUR? No    Event details/Postop Comments:  Pt was happy with anesthesia care.  No complications.  I will follow up with the pt if needed.           Last vitals:  Vitals Value Taken Time   BP 86/41 02/27/23 1000   Temp     Pulse 63 02/27/23 1000   Resp     SpO2 98 % 02/27/23 1003   Vitals shown include unvalidated device data.    Electronically Signed By: HERBERT Treviño CRNA  February 27, 2023  10:05 AM

## 2023-03-09 NOTE — TELEPHONE ENCOUNTER
I asked the RN per them this is fine,  I let pt know in [place], KH   Hypertriglyceridemia Monitoring: I explained this is common when taking isotretinoin. If this worsens they will contact us.

## 2023-03-25 ENCOUNTER — HEALTH MAINTENANCE LETTER (OUTPATIENT)
Age: 48
End: 2023-03-25

## 2023-04-02 ASSESSMENT — ENCOUNTER SYMPTOMS
HEMATOCHEZIA: 0
SHORTNESS OF BREATH: 0
JOINT SWELLING: 0
ABDOMINAL PAIN: 0
CHILLS: 0
NERVOUS/ANXIOUS: 0
HEMATURIA: 0
HEARTBURN: 0
BREAST MASS: 0
ARTHRALGIAS: 0
DIZZINESS: 0
PALPITATIONS: 0
FEVER: 0
CONSTIPATION: 0
EYE PAIN: 0
MYALGIAS: 0
PARESTHESIAS: 0
SORE THROAT: 0
DIARRHEA: 0
COUGH: 0
NAUSEA: 0
HEADACHES: 0
WEAKNESS: 0
DYSURIA: 0
FREQUENCY: 0

## 2023-04-07 ENCOUNTER — OFFICE VISIT (OUTPATIENT)
Dept: FAMILY MEDICINE | Facility: CLINIC | Age: 48
End: 2023-04-07
Payer: COMMERCIAL

## 2023-04-07 VITALS
DIASTOLIC BLOOD PRESSURE: 70 MMHG | HEIGHT: 67 IN | OXYGEN SATURATION: 99 % | HEART RATE: 76 BPM | TEMPERATURE: 97.3 F | BODY MASS INDEX: 22.79 KG/M2 | WEIGHT: 145.2 LBS | RESPIRATION RATE: 16 BRPM | SYSTOLIC BLOOD PRESSURE: 116 MMHG

## 2023-04-07 DIAGNOSIS — N92.4 EXCESSIVE BLEEDING IN PREMENOPAUSAL PERIOD: ICD-10-CM

## 2023-04-07 DIAGNOSIS — Z00.00 ROUTINE GENERAL MEDICAL EXAMINATION AT A HEALTH CARE FACILITY: Primary | ICD-10-CM

## 2023-04-07 LAB
ERYTHROCYTE [DISTWIDTH] IN BLOOD BY AUTOMATED COUNT: 12.6 % (ref 10–15)
FERRITIN SERPL-MCNC: 36 NG/ML (ref 6–175)
HCT VFR BLD AUTO: 42.5 % (ref 35–47)
HGB BLD-MCNC: 14.1 G/DL (ref 11.7–15.7)
MCH RBC QN AUTO: 30.5 PG (ref 26.5–33)
MCHC RBC AUTO-ENTMCNC: 33.2 G/DL (ref 31.5–36.5)
MCV RBC AUTO: 92 FL (ref 78–100)
PLATELET # BLD AUTO: 260 10E3/UL (ref 150–450)
RBC # BLD AUTO: 4.63 10E6/UL (ref 3.8–5.2)
WBC # BLD AUTO: 5.3 10E3/UL (ref 4–11)

## 2023-04-07 PROCEDURE — 82728 ASSAY OF FERRITIN: CPT | Performed by: FAMILY MEDICINE

## 2023-04-07 PROCEDURE — 85027 COMPLETE CBC AUTOMATED: CPT | Performed by: FAMILY MEDICINE

## 2023-04-07 PROCEDURE — 99396 PREV VISIT EST AGE 40-64: CPT | Performed by: FAMILY MEDICINE

## 2023-04-07 PROCEDURE — 36415 COLL VENOUS BLD VENIPUNCTURE: CPT | Performed by: FAMILY MEDICINE

## 2023-04-07 ASSESSMENT — ENCOUNTER SYMPTOMS
SHORTNESS OF BREATH: 0
NAUSEA: 0
CONSTIPATION: 0
HEMATOCHEZIA: 0
DYSURIA: 0
MYALGIAS: 0
NERVOUS/ANXIOUS: 0
HEARTBURN: 0
EYE PAIN: 0
FEVER: 0
CHILLS: 0
BREAST MASS: 0
SORE THROAT: 0
ARTHRALGIAS: 0
COUGH: 0
FREQUENCY: 0
ABDOMINAL PAIN: 0
DIARRHEA: 0
HEADACHES: 0
HEMATURIA: 0
JOINT SWELLING: 0
WEAKNESS: 0
DIZZINESS: 0
PARESTHESIAS: 0
PALPITATIONS: 0

## 2023-04-07 ASSESSMENT — PAIN SCALES - GENERAL: PAINLEVEL: NO PAIN (0)

## 2023-04-07 NOTE — PROGRESS NOTES
SUBJECTIVE:   CC: Bhavana is an 48 year old who presents for preventive health visit.       2023     7:15 AM   Additional Questions   Roomed by Judy JOSEPH   Patient has been advised of split billing requirements and indicates understanding: Yes  Healthy Habits:     Getting at least 3 servings of Calcium per day:  Yes    Bi-annual eye exam:  Yes    Dental care twice a year:  Yes    Sleep apnea or symptoms of sleep apnea:  None    Diet:  Regular (no restrictions)    Frequency of exercise:  6-7 days/week    Duration of exercise:  15-30 minutes    Taking medications regularly:  Yes    Medication side effects:  Not applicable    PHQ-2 Total Score: 0    Additional concerns today:  No      Today's PHQ-2 Score:       2023     5:08 PM   PHQ-2 (  [employer])   Q1: Little interest or pleasure in doing things 0   Q2: Feeling down, depressed or hopeless 0   PHQ-2 Score 0   Q1: Little interest or pleasure in doing things Not at all    Not at all   Q2: Feeling down, depressed or hopeless Not at all    Not at all   PHQ-2 Score 0    0           Social History     Tobacco Use     Smoking status: Former     Packs/day: 1.00     Years: 10.00     Pack years: 10.00     Types: Cigarettes     Quit date: 2000     Years since quittin.8     Smokeless tobacco: Never     Tobacco comments:     quit smoking in .   Vaping Use     Vaping status: Never Used   Substance Use Topics     Alcohol use: Yes     Alcohol/week: 1.7 standard drinks of alcohol             2023     5:08 PM   Alcohol Use   Prescreen: >3 drinks/day or >7 drinks/week? No     Reviewed orders with patient.  Reviewed health maintenance and updated orders accordingly - Yes  BP Readings from Last 3 Encounters:   23 116/70   23 117/72   22 128/78    Wt Readings from Last 3 Encounters:   23 65.9 kg (145 lb 3.2 oz)   22 71.4 kg (157 lb 6.4 oz)   21 73 kg (161 lb)                  Patient Active Problem List   Diagnosis      CARDIOVASCULAR SCREENING; LDL GOAL LESS THAN 160     Breast cyst, right     Past Surgical History:   Procedure Laterality Date     COLONOSCOPY N/A 2023    Normal, repeat 10 years     DILATION AND CURETTAGE, OPERATIVE HYSTEROSCOPY, COMBINED N/A 01/15/2015    removal of endometrial polyp       Social History     Tobacco Use     Smoking status: Former     Packs/day: 1.00     Years: 10.00     Pack years: 10.00     Types: Cigarettes     Quit date: 2000     Years since quittin.8     Smokeless tobacco: Never     Tobacco comments:     quit smoking in .   Vaping Use     Vaping status: Never Used   Substance Use Topics     Alcohol use: Yes     Alcohol/week: 1.7 standard drinks of alcohol     Family History   Problem Relation Age of Onset     Lipids Mother      Hypertension Mother      Arthritis Mother         age 63.     Circulatory Mother      Gynecology Mother         cyst on ovary removed at age 34     Heart Disease Mother         SOB, elev. BP  - believed to be MI, but confirmed no MI (RBBB)     Hyperlipidemia Mother      Gastrointestinal Disease Father         gallbladder removed at age 61     Lipids Father      Cerebrovascular Disease Father         X2       Heart Disease Father         pacemaker/ablation      Diabetes Father      Coronary Artery Disease Father      Hypertension Father      Hyperlipidemia Father      Cerebrovascular Disease Maternal Grandmother          of this at age 72     Cancer - colorectal Maternal Grandfather         at age 70's     Respiratory Maternal Grandfather         lung Cancer and empysema     Cancer Paternal Grandmother         brain cancer at age 80's.     Colon Cancer Paternal Grandfather            Breast Cancer Screenin/21/2022     7:04 AM   Breast CA Risk Assessment (FHS-7)   Do you have a family history of breast, colon, or ovarian cancer? No / Unknown         Mammogram Screening: Recommended annual mammography  Pertinent mammograms  are reviewed under the imaging tab.    History of abnormal Pap smear: NO - age 30- 65 PAP every 3 years recommended      Latest Ref Rng & Units 2/23/2021     2:02 PM 2/23/2021     1:53 PM 12/5/2017     3:30 PM   PAP / HPV   PAP (Historical)  NIL    NIL     HPV 16 DNA NEG^Negative  Negative   Negative     HPV 18 DNA NEG^Negative  Negative   Negative     Other HR HPV NEG^Negative  Negative   Negative       Reviewed and updated as needed this visit by clinical staff   Tobacco  Allergies  Meds   Med Hx  Surg Hx  Fam Hx  Soc Hx        Reviewed and updated as needed this visit by Provider   Tobacco  Allergies  Meds   Med Hx  Surg Hx  Fam Hx  Soc Hx           Review of Systems   Constitutional: Negative for chills and fever.   HENT: Negative for congestion, ear pain, hearing loss and sore throat.    Eyes: Negative for pain and visual disturbance.   Respiratory: Negative for cough and shortness of breath.    Cardiovascular: Negative for chest pain, palpitations and peripheral edema.   Gastrointestinal: Negative for abdominal pain, constipation, diarrhea, heartburn, hematochezia and nausea.   Breasts:  Negative for tenderness, breast mass and discharge.   Genitourinary: Negative for dysuria, frequency, genital sores, hematuria, pelvic pain, urgency, vaginal bleeding and vaginal discharge.   Musculoskeletal: Negative for arthralgias, joint swelling and myalgias.   Skin: Negative for rash.   Neurological: Negative for dizziness, weakness, headaches and paresthesias.   Psychiatric/Behavioral: Negative for mood changes. The patient is not nervous/anxious.      Her periods are unpredictable.  Sometimes heavy with more clots, sometimes she will skip a month.  Overall she feels she is managing them fairly well, but they do get bothersome.  Bleeds through a super pad very quickly.  Occasionally gets lightheaded the week before her menses, not sure if just hormonal.    Has worked hard at weight loss, through healthier  "eating and she is getting up early in the morning to work out.    Work is going well, marriage is going well, kids are healthy.     OBJECTIVE:   /70   Pulse 76   Temp 97.3  F (36.3  C) (Tympanic)   Resp 16   Ht 1.714 m (5' 7.48\")   Wt 65.9 kg (145 lb 3.2 oz)   LMP 04/04/2023   SpO2 99%   BMI 22.42 kg/m    Physical Exam  GENERAL: healthy, alert and no distress  EYES: Eyes grossly normal to inspection, PERRL and conjunctivae and sclerae normal  HENT: ear canals and TM's normal, nose and mouth without ulcers or lesions  NECK: no adenopathy, no asymmetry, masses, or scars and thyroid normal to palpation, no bruits.  RESP: lungs clear to auscultation - no rales, rhonchi or wheezes  BREAST: normal without masses, tenderness or nipple discharge and no palpable axillary masses or adenopathy  CV: regular rate and rhythm, normal S1 S2, no S3 or S4, no murmur, click or rub, no peripheral edema and peripheral pulses strong  ABDOMEN: soft, nontender, no hepatosplenomegaly, no masses and bowel sounds normal  MS: no gross musculoskeletal defects noted, no edema  SKIN: no suspicious lesions or rashes  NEURO: Normal strength and tone, mentation intact and speech normal  PSYCH: mentation appears normal, affect normal/bright    Diagnostic Test Results:  Orders Placed This Encounter   Procedures     REVIEW OF HEALTH MAINTENANCE PROTOCOL ORDERS     Ferritin     CBC with platelets        ASSESSMENT/PLAN:       ICD-10-CM    1. Routine general medical examination at a health care facility  Z00.00       2. Excessive bleeding in premenopausal period  N92.4 Ferritin     CBC with platelets     CBC with platelets     Ferritin          Patient has been advised of split billing requirements and indicates understanding: Yes but NA  I recommend yearly physical, monthly self breast exam, yearly mammogram.  Pap smear every 3 years, not due today.    We will check hemoglobin and CBC to make sure she is not anemic or iron deficient " because of her periods.  See below.    COUNSELING:  Reviewed preventive health counseling, as reflected in patient instructions  Special attention given to:        Regular exercise       Healthy diet/nutrition       Vision screening       Immunizations    Up to date             Osteoporosis prevention/bone health - recommend strengthening exercises in addition to weight management.        Colorectal Cancer Screening is up to date       (Lianne)menopause management -we discussed that this is very likely normal for perimenopause.  However if she is becoming anemic or iron deficient then we might be more inclined to treat.  At this time she does not want any kind of hormonal or procedural treatment but if her labs are abnormal she might reconsider.      She reports that she quit smoking about 22 years ago. Her smoking use included cigarettes. She has a 10.00 pack-year smoking history. She has never used smokeless tobacco.          Flori Acosta MD  [facility]

## 2023-12-18 ENCOUNTER — PATIENT OUTREACH (OUTPATIENT)
Dept: CARE COORDINATION | Facility: CLINIC | Age: 48
End: 2023-12-18
Payer: COMMERCIAL

## 2024-01-15 ENCOUNTER — PATIENT OUTREACH (OUTPATIENT)
Dept: CARE COORDINATION | Facility: CLINIC | Age: 49
End: 2024-01-15
Payer: COMMERCIAL

## 2024-02-19 ENCOUNTER — HOSPITAL ENCOUNTER (OUTPATIENT)
Dept: MAMMOGRAPHY | Facility: CLINIC | Age: 49
Discharge: HOME | End: 2024-02-19
Attending: FAMILY MEDICINE | Admitting: FAMILY MEDICINE
Payer: COMMERCIAL

## 2024-02-19 DIAGNOSIS — Z12.31 VISIT FOR SCREENING MAMMOGRAM: ICD-10-CM

## 2024-02-19 PROCEDURE — 77063 BREAST TOMOSYNTHESIS BI: CPT

## 2024-05-25 ENCOUNTER — HEALTH MAINTENANCE LETTER (OUTPATIENT)
Age: 49
End: 2024-05-25

## 2024-06-21 ENCOUNTER — OFFICE VISIT (OUTPATIENT)
Dept: FAMILY MEDICINE | Facility: CLINIC | Age: 49
End: 2024-06-21
Payer: COMMERCIAL

## 2024-06-21 VITALS
DIASTOLIC BLOOD PRESSURE: 78 MMHG | HEIGHT: 68 IN | OXYGEN SATURATION: 99 % | HEART RATE: 68 BPM | RESPIRATION RATE: 18 BRPM | SYSTOLIC BLOOD PRESSURE: 122 MMHG | BODY MASS INDEX: 23.04 KG/M2 | WEIGHT: 152 LBS | TEMPERATURE: 97.4 F

## 2024-06-21 DIAGNOSIS — Z00.00 ROUTINE PHYSICAL EXAMINATION: Primary | ICD-10-CM

## 2024-06-21 DIAGNOSIS — E61.1 IRON DEFICIENCY: ICD-10-CM

## 2024-06-21 DIAGNOSIS — R42 LIGHTHEADEDNESS: ICD-10-CM

## 2024-06-21 DIAGNOSIS — Z12.4 CERVICAL CANCER SCREENING: ICD-10-CM

## 2024-06-21 DIAGNOSIS — N92.0 MENORRHAGIA WITH REGULAR CYCLE: ICD-10-CM

## 2024-06-21 DIAGNOSIS — Z12.31 ENCOUNTER FOR SCREENING MAMMOGRAM FOR MALIGNANT NEOPLASM OF BREAST: ICD-10-CM

## 2024-06-21 LAB
ANION GAP SERPL CALCULATED.3IONS-SCNC: 10 MMOL/L (ref 7–15)
BUN SERPL-MCNC: 16.9 MG/DL (ref 6–20)
CALCIUM SERPL-MCNC: 9.6 MG/DL (ref 8.6–10)
CHLORIDE SERPL-SCNC: 104 MMOL/L (ref 98–107)
CREAT SERPL-MCNC: 0.82 MG/DL (ref 0.51–0.95)
DEPRECATED HCO3 PLAS-SCNC: 26 MMOL/L (ref 22–29)
EGFRCR SERPLBLD CKD-EPI 2021: 88 ML/MIN/1.73M2
FERRITIN SERPL-MCNC: 18 NG/ML (ref 6–175)
GLUCOSE SERPL-MCNC: 99 MG/DL (ref 70–99)
HGB BLD-MCNC: 13.1 G/DL (ref 11.7–15.7)
POTASSIUM SERPL-SCNC: 4.3 MMOL/L (ref 3.4–5.3)
SODIUM SERPL-SCNC: 140 MMOL/L (ref 135–145)
TSH SERPL DL<=0.005 MIU/L-ACNC: 1.31 UIU/ML (ref 0.3–4.2)

## 2024-06-21 PROCEDURE — 87624 HPV HI-RISK TYP POOLED RSLT: CPT | Performed by: FAMILY MEDICINE

## 2024-06-21 PROCEDURE — 99396 PREV VISIT EST AGE 40-64: CPT | Performed by: FAMILY MEDICINE

## 2024-06-21 PROCEDURE — 80048 BASIC METABOLIC PNL TOTAL CA: CPT | Performed by: FAMILY MEDICINE

## 2024-06-21 PROCEDURE — 82728 ASSAY OF FERRITIN: CPT | Performed by: FAMILY MEDICINE

## 2024-06-21 PROCEDURE — 84443 ASSAY THYROID STIM HORMONE: CPT | Performed by: FAMILY MEDICINE

## 2024-06-21 PROCEDURE — G0145 SCR C/V CYTO,THINLAYER,RESCR: HCPCS | Performed by: FAMILY MEDICINE

## 2024-06-21 PROCEDURE — 85018 HEMOGLOBIN: CPT | Performed by: FAMILY MEDICINE

## 2024-06-21 PROCEDURE — 36415 COLL VENOUS BLD VENIPUNCTURE: CPT | Performed by: FAMILY MEDICINE

## 2024-06-21 SDOH — HEALTH STABILITY: PHYSICAL HEALTH: ON AVERAGE, HOW MANY DAYS PER WEEK DO YOU ENGAGE IN MODERATE TO STRENUOUS EXERCISE (LIKE A BRISK WALK)?: 5 DAYS

## 2024-06-21 ASSESSMENT — SOCIAL DETERMINANTS OF HEALTH (SDOH): HOW OFTEN DO YOU GET TOGETHER WITH FRIENDS OR RELATIVES?: ONCE A WEEK

## 2024-06-21 NOTE — PATIENT INSTRUCTIONS
"Patient Education   Preventive Care Advice   This is general advice we often give to help people stay healthy. Your care team may have specific advice just for you. Please talk to your care team about your own preventive care needs.  Lifestyle  Exercise at least 150 minutes each week (30 minutes a day, 5 days a week).  Do muscle strengthening activities 2 days a week. These help control your weight and prevent disease.  No smoking.  Wear sunscreen to prevent skin cancer.  Have your home tested for radon every 2 to 5 years. Radon is a colorless, odorless gas that can harm your lungs. To learn more, go to www.health.[facility].[place] and search for \"Radon in Homes.\"  Keep guns unloaded and locked up in a safe place like a safe or gun vault, or, use a gun lock and hide the keys. Always lock away bullets separately. To learn more, visit [employer].gov and search for \"safe gun storage.\"  Nutrition  Eat 5 or more servings of fruits and vegetables each day.  Try wheat bread, brown rice and whole grain pasta (instead of white bread, rice, and pasta).  Get enough calcium and vitamin D. Check the label on foods and aim for 100% of the RDA (recommended daily allowance).  Regular exams  Have a dental exam and cleaning every 6 months.  See your health care team every year to talk about:  Any changes in your health.  Any medicines your care team has prescribed.  Preventive care, family planning, and ways to prevent chronic diseases.  Shots (vaccines)   HPV shots (up to age 26), if you've never had them before.  Hepatitis B shots (up to age 59), if you've never had them before.  COVID-19 shot: Get this shot when it's due.  Flu shot: Get a flu shot every year.  Tetanus shot: Get a tetanus shot every 10 years.  Pneumococcal, hepatitis A, and RSV shots: Ask your care team if you need these based on your risk.  Shingles shot (for age 50 and up).  General health tests  Diabetes screening:  Starting at age 35, Get screened for diabetes at least " every 3 years.  If you are younger than age 35, ask your care team if you should be screened for diabetes.  Cholesterol test: At age 39, start having a cholesterol test every 5 years, or more often if advised.  Bone density scan (DEXA): At age 50, ask your care team if you should have this scan for osteoporosis (brittle bones).  Hepatitis C: Get tested at least once in your life.  Abdominal aortic aneurysm screening: Talk to your doctor about having this screening if you:  Have ever smoked; and  Are biologically male; and  Are between the ages of 65 and 75.  STIs (sexually transmitted infections)  Before age 24: Ask your care team if you should be screened for STIs.  After age 24: Get screened for STIs if you're at risk. You are at risk for STIs (including HIV) if:  You are sexually active with more than one person.  You don't use condoms every time.  You or a partner was diagnosed with a sexually transmitted infection.  If you are at risk for HIV, ask about PrEP medicine to prevent HIV.  Get tested for HIV at least once in your life, whether you are at risk for HIV or not.  Cancer screening tests  Cervical cancer screening: If you have a cervix, begin getting regular cervical cancer screening tests at age 21. Most people who have regular screenings with normal results can stop after age 65. Talk about this with your provider.  Breast cancer scan (mammogram): If you've ever had breasts, begin having regular mammograms starting at age 40. This is a scan to check for breast cancer.  Colon cancer screening: It is important to start screening for colon cancer at age 45.  Have a colonoscopy test every 10 years (or more often if you're at risk) Or, ask your provider about stool tests like a FIT test every year or Cologuard test every 3 years.  To learn more about your testing options, visit: www.[employer]/746359.pdf.  For help making a decision, visit: miguel/mu33605.  Prostate cancer screening test: If you have a  prostate and are age 55 to 69, ask your provider if you would benefit from a yearly prostate cancer screening test.  Lung cancer screening: If you are a current or former smoker age 50 to 80, ask your care team if ongoing lung cancer screenings are right for you.  For informational purposes only. Not to replace the advice of your health care provider. Copyright   2023 [facility]. All rights reserved. Clinically reviewed by the [facility]. [employer] 892083 - REV 04/24.

## 2024-06-21 NOTE — PROGRESS NOTES
Preventive Care Visit  [facility]  Flori Acosta MD, Family Medicine  Jun 21, 2024      Assessment & Plan       ICD-10-CM    1. Routine physical examination  Z00.00 REVIEW OF HEALTH MAINTENANCE PROTOCOL ORDERS      2. Cervical cancer screening  Z12.4 Pap Screen with HPV - Recommended Age 30 - 65 Years      3. Menorrhagia with regular cycle  N92.0 Hemoglobin     Ferritin      4. Lightheadedness  R42 Hemoglobin     Basic metabolic panel  (Ca, Cl, CO2, Creat, Gluc, K, Na, BUN)     TSH with free T4 reflex      5. Encounter for screening mammogram for malignant neoplasm of breast  Z12.31 MA Screen Bilateral w/Stu           Patient has been advised of split billing requirements and indicates understanding: Yes    I recommend a yearly physical, monthly self breast exam, yearly mammogram, Pap smear every 3 years if normal or per ACOG guidelines.    We talked about her perineal pain and I would like her to consider pelvic floor physical therapy.  We talked about ways to relax her perineal muscles for intercourse, adequate foreplay and lubrication, and Kegel exercises.  She will let me know and I will place referral if she would like it.    Because of her lightheadedness complaint and her heavy periods, I am repeating her hemoglobin and ferritin today as well as a TSH and basic panel to rule out electrolyte abnormalities or hypothyroidism.  We talked about possible effects from her vision, and dietary changes to make sure her blood sugar is stable.  She eats very healthy, her weight is healthy, her blood pressure is normal and she is not on any medications that should be causing lightheadedness.  Will notify her with lab results.  She has no cardiac symptoms to suggest a cardiac cause but if things get worse she should have further workup for other possibilities.    Counseling  Appropriate preventive services were discussed with this patient, including applicable screening as  appropriate for fall prevention, nutrition, physical activity, Tobacco-use cessation, weight loss and cognition.  Checklist reviewing preventive services available has been given to the patient.  Reviewed patient's diet, addressing concerns and/or questions.       Flori Acosta MD     Frieda Bateman is a 49 year old, presenting for the following:  Physical        6/21/2024     7:42 AM   Additional Questions   Roomed by Love HU MA        Health Care Directive  Patient does not have a Health Care Directive or Living Will: Discussed advance care planning with patient; information given to patient to review.    HPI        6/21/2024   General Health   How would you rate your overall physical health? Good   Feel stress (tense, anxious, or unable to sleep) Not at all            6/21/2024   Nutrition   Three or more servings of calcium each day? Yes   Diet: Regular (no restrictions)   How many servings of fruit and vegetables per day? 4 or more   How many sweetened beverages each day? 0-1            6/21/2024   Exercise   Days per week of moderate/strenous exercise 5 days            6/21/2024   Social Factors   Frequency of gathering with friends or relatives Once a week   Worry food won't last until get money to buy more No   Food not last or not have enough money for food? No   Do you have housing? (Housing is defined as stable permanent housing and does not include staying ouside in a car, in a tent, in an abandoned building, in an overnight shelter, or couch-surfing.) Yes   Are you worried about losing your housing? No   Lack of transportation? No   Unable to get utilities (heat,electricity)? No            6/21/2024   Dental   Dentist two times every year? Yes            6/21/2024   TB Screening   Were you born outside of the [place]? No            Today's PHQ-2 Score:       6/21/2024     7:39 AM   PHQ-2 ( 1999 [employer])   Q1: Little interest or pleasure in doing things 0   Q2: Feeling down, depressed or  hopeless 0   PHQ-2 Score 0   Q1: Little interest or pleasure in doing things Not at all   Q2: Feeling down, depressed or hopeless Not at all   PHQ-2 Score 0           2024   Substance Use   Alcohol more than 3/day or more than 7/wk No   Do you use any other substances recreationally? No        Social History     Tobacco Use    Smoking status: Former     Current packs/day: 0.00     Average packs/day: 1 pack/day for 10.0 years (10.0 ttl pk-yrs)     Types: Cigarettes     Start date: 1990     Quit date: 2000     Years since quittin.0    Smokeless tobacco: Never    Tobacco comments:     quit smoking in .   Vaping Use    Vaping status: Never Used   Substance Use Topics    Alcohol use: Yes     Alcohol/week: 1.7 standard drinks of alcohol    Drug use: No             2024   Breast Cancer Screening   Family history of breast, colon, or ovarian cancer? No / Unknown          2024   LAST FHS-7 RESULTS   1st degree relative breast or ovarian cancer No   Any relative bilateral breast cancer No   Any male have breast cancer No   Any ONE woman have BOTH breast AND ovarian cancer No   Any woman with breast cancer before 50yrs No   2 or more relatives with breast AND/OR ovarian cancer No   2 or more relatives with breast AND/OR bowel cancer No           Mammogram Screening - Mammogram every 1-2 years updated in Health Maintenance based on mutual decision making        2024   STI Screening   New sexual partner(s) since last STI/HIV test? No        History of abnormal Pap smear: No - age 30- 64 PAP with HPV every 3 years recommended        Latest Ref Rng & Units 2021     2:02 PM 2021     1:53 PM 2017     3:30 PM   PAP / HPV   PAP (Historical)  NIL   NIL    HPV 16 DNA NEG^Negative  Negative  Negative    HPV 18 DNA NEG^Negative  Negative  Negative    Other HR HPV NEG^Negative  Negative  Negative      ASCVD Risk   The 10-year ASCVD risk score (Renny NAZARIO, et al., 2019) is: 0.4%     Values used to calculate the score:      Age: 49 years      Sex: Female      Is Non- : No      Diabetic: No      Tobacco smoker: No      Systolic Blood Pressure: 122 mmHg      Is BP treated: No      HDL Cholesterol: 83 mg/dL      Total Cholesterol: 173 mg/dL        2024   Contraception/Family Planning   Questions about contraception or family planning No           Reviewed and updated as needed this visit by Provider   Tobacco   Meds   Med Hx  Surg Hx  Fam Hx  Soc Hx Sexual Activity          BP Readings from Last 3 Encounters:   24 122/78   23 116/70   23 117/72    Wt Readings from Last 3 Encounters:   24 68.9 kg (152 lb)   23 65.9 kg (145 lb 3.2 oz)   22 71.4 kg (157 lb 6.4 oz)                  Patient Active Problem List   Diagnosis    CARDIOVASCULAR SCREENING; LDL GOAL LESS THAN 160    Breast cyst, right     Past Surgical History:   Procedure Laterality Date    COLONOSCOPY N/A 2023    Normal, repeat 10 years    DILATION AND CURETTAGE, OPERATIVE HYSTEROSCOPY, COMBINED N/A 01/15/2015    removal of endometrial polyp       Social History     Tobacco Use    Smoking status: Former     Current packs/day: 0.00     Average packs/day: 1 pack/day for 10.0 years (10.0 ttl pk-yrs)     Types: Cigarettes     Start date: 1990     Quit date: 2000     Years since quittin.0    Smokeless tobacco: Never    Tobacco comments:     quit smoking in .   Substance Use Topics    Alcohol use: Yes     Alcohol/week: 1.7 standard drinks of alcohol     Family History   Problem Relation Age of Onset    Lipids Mother     Hypertension Mother     Arthritis Mother         age 63.    Circulatory Mother     Gynecology Mother         cyst on ovary removed at age 34    Heart Disease Mother         SOB, elev. BP  - believed to be MI, but confirmed no MI (RBBB)    Hyperlipidemia Mother     Gastrointestinal Disease Father         gallbladder removed at age 61     Lipids Father     Cerebrovascular Disease Father         X2      Heart Disease Father         pacemaker/ablation     Diabetes Father     Coronary Artery Disease Father     Hypertension Father     Hyperlipidemia Father     Cerebrovascular Disease Maternal Grandmother          of this at age 72    Cancer - colorectal Maternal Grandfather         at age 70's    Respiratory Maternal Grandfather         lung Cancer and empysema    Cancer Paternal Grandmother         brain cancer at age 80's.    Colon Cancer Paternal Grandfather          Current Outpatient Medications   Medication Sig Dispense Refill    IBUPROFEN PO Take 400 mg by mouth every 6 hours as needed for moderate pain      Multiple Vitamin (MULTI-VITAMIN PO) Take  by mouth. 1 daily           Review of Systems  CONSTITUTIONAL: NEGATIVE for fever, chills, change in weight  INTEGUMENTARY/SKIN: NEGATIVE for worrisome rashes, moles or lesions.  She did not complain of it but I noted a small rash between her breasts.  She thinks this is getting a little bit worse over time it has been there for a while.  She has overtime shaved that area because she gets more hair growth there and sometimes she thinks shaving makes the rash get worse.  EYES: NEGATIVE for vision changes or irritation, but her vision has been changing and she does have contacts.  She is trying some multifocal contacts which are new for her and she is not sure she likes them.  ENT/MOUTH: NEGATIVE for ear, mouth and throat problems  RESP: NEGATIVE for significant cough or SOB  BREAST: NEGATIVE for masses, tenderness or discharge  CV: NEGATIVE for chest pain, palpitations or peripheral edema  GI: NEGATIVE for nausea, abdominal pain, heartburn, or change in bowel habits  : NEGATIVE for frequency, dysuria, or hematuria.  However she does have some pain with intercourse, feels like the tissue of her perineum between the vagina and the anus gets sore and tense.  Not just with intercourse,  "sometimes happens throughout the day it feels like there is pressure there and she has to push up on it.  Almost like a prolapse.  But not in the vagina, between the vagina and anus.  Her periods are still fairly heavy.  They are regular and she is not on hormones.  MUSCULOSKELETAL: NEGATIVE for significant arthralgias or myalgia  NEURO: NEGATIVE for weakness, dizziness or paresthesias but she sometimes feels lightheaded, almost vertigo-like when she is working at her computer.  Not sure if it is vision related.  ENDOCRINE: NEGATIVE for temperature intolerance, skin/hair changes  HEME: NEGATIVE for bleeding problems  PSYCHIATRIC: NEGATIVE for changes in mood or affect     Objective    Exam  /78   Pulse 68   Temp 97.4  F (36.3  C) (Temporal)   Resp 18   Ht 1.715 m (5' 7.5\")   Wt 68.9 kg (152 lb)   LMP 06/13/2024 (Exact Date)   SpO2 99%   BMI 23.46 kg/m     Estimated body mass index is 23.46 kg/m  as calculated from the following:    Height as of this encounter: 1.715 m (5' 7.5\").    Weight as of this encounter: 68.9 kg (152 lb).    Physical Exam  GENERAL: alert and no distress  EYES: Eyes grossly normal to inspection, PERRL and conjunctivae and sclerae normal  HENT: ear canals and TM's normal, nose and mouth without ulcers or lesions  NECK: no adenopathy, no asymmetry, masses, or scars, no bruits  RESP: lungs clear to auscultation - no rales, rhonchi or wheezes  BREAST: normal without masses, tenderness or nipple discharge and no palpable axillary masses or adenopathy  CV: regular rate and rhythm, normal S1 S2, no S3 or S4, no murmur, click or rub, no peripheral edema  ABDOMEN: soft, nontender, no hepatosplenomegaly, no masses and bowel sounds normal   (female): Vaginal exam reveals normal external and internal genitalia.  Cervix is closed, long and thick.  No lesions or abnormalities seen.  Pap co-test collected. Bimanual exam reveals a nontender, nongravid uterus with no CMT.  No adnexal masses or " tenderness.   Perineum is strong without abnormality.  No significant cystocele or rectocele, no significant uterine prolapse.  MS: no gross musculoskeletal defects noted, no edema  SKIN: no suspicious lesions but she does have a slightly hyperpigmented macular rash between the breasts with a few dark hairs growing out of it.  See photo.  She states this has been there for a while but seems to be getting a little bit worse over time  NEURO: Normal strength and tone, mentation intact and speech normal  PSYCH: mentation appears normal, affect normal/bright        Signed Electronically by: Flori Acosta MD

## 2024-06-24 LAB
HPV HR 12 DNA CVX QL NAA+PROBE: NEGATIVE
HPV16 DNA CVX QL NAA+PROBE: NEGATIVE
HPV18 DNA CVX QL NAA+PROBE: NEGATIVE
HUMAN PAPILLOMA VIRUS FINAL DIAGNOSIS: NORMAL

## 2024-06-26 LAB
BKR LAB AP GYN ADEQUACY: NORMAL
BKR LAB AP GYN INTERPRETATION: NORMAL
BKR LAB AP PREVIOUS ABNORMAL: NORMAL
PATH REPORT.COMMENTS IMP SPEC: NORMAL
PATH REPORT.COMMENTS IMP SPEC: NORMAL
PATH REPORT.RELEVANT HX SPEC: NORMAL

## 2024-07-01 ENCOUNTER — MYC MEDICAL ADVICE (OUTPATIENT)
Dept: FAMILY MEDICINE | Facility: CLINIC | Age: 49
End: 2024-07-01
Payer: COMMERCIAL

## 2024-07-05 RX ORDER — FERROUS GLUCONATE 324(38)MG
324 TABLET ORAL
Qty: 90 TABLET | Refills: 3 | Status: SHIPPED | OUTPATIENT
Start: 2024-07-05

## 2024-07-05 NOTE — RESULT ENCOUNTER NOTE
Bhavana, here are your lab results. Overall everything looks good except your iron level.  Your hemoglobin is still normal which means you are not anemic, but the ferritin is dropping a little which means your iron stores in your body are depleting.  I recommend that you take an iron supplement to help build this back up before you do become anemic.  I will prescribe one for you, but if you prefer you can buy this over-the-counter as well.  Your thyroid is perfect.  Your electrolytes and kidney function are perfect.  Flori Acosta MD

## 2024-12-26 ENCOUNTER — PATIENT OUTREACH (OUTPATIENT)
Dept: CARE COORDINATION | Facility: CLINIC | Age: 49
End: 2024-12-26
Payer: COMMERCIAL

## 2025-01-20 ENCOUNTER — NURSE TRIAGE (OUTPATIENT)
Dept: FAMILY MEDICINE | Facility: CLINIC | Age: 50
End: 2025-01-20
Payer: COMMERCIAL

## 2025-01-20 NOTE — TELEPHONE ENCOUNTER
Nurse Triage SBAR    Is this a 2nd Level Triage? NO    Situation: patient is calling in with complaints of unusual vaginal bleeding. She normally gets her period every 27-35 days. Small clots and moderate to heavy bleeding is normal. Patient states she had a normal period this month, then had 5 days off and got her period again. Very abnormal. She has been passing very large clots, longer than 1 inch in length and more oblong in shape. 2 or 3 per day.  Still dark red. . Denies passing tissue. Denies abdomen pain or cramping. Denies dizziness or lightheadedness. Normal amount of bleeding.     Background:  abnormal vaginal bleeding, got period 5 days after end of period, larger than 1 inch clots.     Assessment: Advised to be see within 2 weeks.     Protocol Recommended Disposition:   See in Office Within 2 Weeks    Recommendation: patient verbalized understanding and is agreeable. Patient scheduled tomorrow with open provider. RN reviewed red flag symptoms with patient and when to seek emergency care. Patient denies any other questions or concerns at this time.      Patient scheduled for tomorrow.     Does the patient meet one of the following criteria for ADS visit consideration? 16+ years old, with an [facility] PCP     TIP  Providers, please consider if this condition is appropriate for management at one of our Acute and Diagnostic Services sites.     If patient is a good candidate, please use dotphrase <dot>triageresponse and select Refer to ADS to document.      Reason for Disposition   Age > 39 years with irregular or excessive bleeding    Additional Information   Negative: SEVERE vaginal bleeding (e.g., continuous red blood from vagina, or large blood clots) and very weak (can't stand)   Negative: Passed out (e.g., fainted, lost consciousness, blacked out and was not responding)   Negative: Difficult to awaken or acting confused (e.g., disoriented, slurred speech)   Negative: Shock suspected (e.g., cold/pale/clammy  skin, too weak to stand, low BP, rapid pulse)   Negative: Sounds like a life-threatening emergency to the triager   Negative: Pregnant 20 or more weeks (5 months or more)   Negative: Pregnant < 20 weeks (less than 5 months)   Negative: Postpartum (from 0 to 6 weeks after delivery)   Negative: Vaginal discharge is main symptom and bleeding is slight   Negative: SEVERE abdominal pain (e.g., excruciating)   Negative: SEVERE dizziness (e.g., unable to stand, requires support to walk, feels like passing out now)   Negative: SEVERE vaginal bleeding (e.g., soaking 2 pads or tampons per hour and present 2 or more hours; 1 menstrual cup every 2 hours)   Negative: Patient sounds very sick or weak to the triager   Negative: MODERATE vaginal bleeding (i.e., soaking pad or tampon per hour and present > 6 hours; 1 menstrual cup every 6 hours)   Negative: Constant abdominal pain lasting > 2 hours   Negative: Pale skin (pallor) of new-onset or getting worse   Negative: Taking Coumadin (warfarin) or other strong blood thinner, or known bleeding disorder (e.g., thrombocytopenia)   Negative: Skin bruises or nosebleed and not caused by an injury   Negative: Patient wants to be seen   Negative: Passed tissue (e.g., gray-white)   Negative: Bleeding or spotting after procedure (e.g., biopsy) or pelvic examination (e.g., pap smear) that lasts > 7 days   Negative: Bleeding or spotting between regular periods occurs more than three cycles (3 months), and using birth control medicine (e.g., pills, patch, Depo-Provera, Implanon, vaginal ring, Mirena IUD)   Negative: Bleeding or spotting occurs after hysterectomy   Negative: Missed period has occurred 2 or more times in the last year and the cause is not known   Negative: Menstrual cycle < 21 days OR > 35 days, and occurs more than two cycles (2 months) this past year   Negative: Bleeding or spotting between regular periods occurs more than three cycles (3 months) this past year   Negative:  Periods last > 7 days   Negative: Uses menstrual cups and more than 80 ml blood per menstrual period   Negative: Periods with > 6 soaked pads or tampons per day    Protocols used: Vaginal Bleeding - [id number]

## 2025-01-21 ENCOUNTER — HOSPITAL ENCOUNTER (OUTPATIENT)
Dept: ULTRASOUND IMAGING | Facility: CLINIC | Age: 50
Discharge: HOME | End: 2025-01-21
Attending: NURSE PRACTITIONER
Payer: COMMERCIAL

## 2025-01-21 ENCOUNTER — OFFICE VISIT (OUTPATIENT)
Dept: FAMILY MEDICINE | Facility: CLINIC | Age: 50
End: 2025-01-21
Payer: COMMERCIAL

## 2025-01-21 VITALS
DIASTOLIC BLOOD PRESSURE: 86 MMHG | TEMPERATURE: 97.7 F | SYSTOLIC BLOOD PRESSURE: 130 MMHG | OXYGEN SATURATION: 99 % | HEART RATE: 73 BPM | RESPIRATION RATE: 16 BRPM | BODY MASS INDEX: 23.4 KG/M2 | WEIGHT: 154.4 LBS | HEIGHT: 68 IN

## 2025-01-21 DIAGNOSIS — N84.0 UTERINE POLYP: ICD-10-CM

## 2025-01-21 DIAGNOSIS — N93.9 ABNORMAL UTERINE BLEEDING: ICD-10-CM

## 2025-01-21 DIAGNOSIS — D25.0 SUBMUCOUS LEIOMYOMA OF UTERUS: ICD-10-CM

## 2025-01-21 DIAGNOSIS — E61.1 IRON DEFICIENCY: ICD-10-CM

## 2025-01-21 DIAGNOSIS — N92.4 EXCESSIVE BLEEDING IN PREMENOPAUSAL PERIOD: ICD-10-CM

## 2025-01-21 DIAGNOSIS — N93.9 ABNORMAL UTERINE BLEEDING: Primary | ICD-10-CM

## 2025-01-21 LAB
ALBUMIN SERPL BCG-MCNC: 4.7 G/DL (ref 3.5–5.2)
ALP SERPL-CCNC: 56 U/L (ref 40–150)
ALT SERPL W P-5'-P-CCNC: 23 U/L (ref 0–50)
ANION GAP SERPL CALCULATED.3IONS-SCNC: 13 MMOL/L (ref 7–15)
AST SERPL W P-5'-P-CCNC: 20 U/L (ref 0–45)
BASOPHILS # BLD AUTO: 0.1 10E3/UL (ref 0–0.2)
BASOPHILS NFR BLD AUTO: 1 %
BILIRUB SERPL-MCNC: 0.4 MG/DL
BUN SERPL-MCNC: 14.1 MG/DL (ref 6–20)
CALCIUM SERPL-MCNC: 9.7 MG/DL (ref 8.8–10.4)
CHLORIDE SERPL-SCNC: 105 MMOL/L (ref 98–107)
CREAT SERPL-MCNC: 0.79 MG/DL (ref 0.51–0.95)
EGFRCR SERPLBLD CKD-EPI 2021: >90 ML/MIN/1.73M2
EOSINOPHIL # BLD AUTO: 0.2 10E3/UL (ref 0–0.7)
EOSINOPHIL NFR BLD AUTO: 3 %
ERYTHROCYTE [DISTWIDTH] IN BLOOD BY AUTOMATED COUNT: 12.1 % (ref 10–15)
FERRITIN SERPL-MCNC: 48 NG/ML (ref 6–175)
GLUCOSE SERPL-MCNC: 97 MG/DL (ref 70–99)
HCO3 SERPL-SCNC: 22 MMOL/L (ref 22–29)
HCT VFR BLD AUTO: 43.3 % (ref 35–47)
HGB BLD-MCNC: 15 G/DL (ref 11.7–15.7)
IMM GRANULOCYTES # BLD: 0 10E3/UL
IMM GRANULOCYTES NFR BLD: 0 %
LYMPHOCYTES # BLD AUTO: 1.1 10E3/UL (ref 0.8–5.3)
LYMPHOCYTES NFR BLD AUTO: 15 %
MCH RBC QN AUTO: 31.5 PG (ref 26.5–33)
MCHC RBC AUTO-ENTMCNC: 34.6 G/DL (ref 31.5–36.5)
MCV RBC AUTO: 91 FL (ref 78–100)
MONOCYTES # BLD AUTO: 0.6 10E3/UL (ref 0–1.3)
MONOCYTES NFR BLD AUTO: 7 %
NEUTROPHILS # BLD AUTO: 5.6 10E3/UL (ref 1.6–8.3)
NEUTROPHILS NFR BLD AUTO: 74 %
NRBC # BLD AUTO: 0 10E3/UL
NRBC BLD AUTO-RTO: 0 /100
PLATELET # BLD AUTO: 313 10E3/UL (ref 150–450)
POTASSIUM SERPL-SCNC: 4.3 MMOL/L (ref 3.4–5.3)
PROT SERPL-MCNC: 7.5 G/DL (ref 6.4–8.3)
RBC # BLD AUTO: 4.76 10E6/UL (ref 3.8–5.2)
SODIUM SERPL-SCNC: 140 MMOL/L (ref 135–145)
TSH SERPL DL<=0.005 MIU/L-ACNC: 1.11 UIU/ML (ref 0.3–4.2)
WBC # BLD AUTO: 7.5 10E3/UL (ref 4–11)

## 2025-01-21 PROCEDURE — 36415 COLL VENOUS BLD VENIPUNCTURE: CPT | Performed by: NURSE PRACTITIONER

## 2025-01-21 PROCEDURE — G2211 COMPLEX E/M VISIT ADD ON: HCPCS | Performed by: NURSE PRACTITIONER

## 2025-01-21 PROCEDURE — 76856 US EXAM PELVIC COMPLETE: CPT

## 2025-01-21 PROCEDURE — 82728 ASSAY OF FERRITIN: CPT | Performed by: NURSE PRACTITIONER

## 2025-01-21 PROCEDURE — 85025 COMPLETE CBC W/AUTO DIFF WBC: CPT | Performed by: NURSE PRACTITIONER

## 2025-01-21 PROCEDURE — 80053 COMPREHEN METABOLIC PANEL: CPT | Performed by: NURSE PRACTITIONER

## 2025-01-21 PROCEDURE — 84443 ASSAY THYROID STIM HORMONE: CPT | Performed by: NURSE PRACTITIONER

## 2025-01-21 PROCEDURE — 76830 TRANSVAGINAL US NON-OB: CPT

## 2025-01-21 PROCEDURE — 99214 OFFICE O/P EST MOD 30 MIN: CPT | Performed by: NURSE PRACTITIONER

## 2025-01-21 ASSESSMENT — PAIN SCALES - GENERAL: PAINLEVEL_OUTOF10: NO PAIN (0)

## 2025-01-21 NOTE — PROGRESS NOTES
Assessment & Plan     Abnormal uterine bleeding  Excessive bleeding in premenopausal period  Iron deficiency  - TSH with free T4 reflex; Future  - CBC with Platelets & Differential; Future  - Comprehensive metabolic panel (BMP + Alb, Alk Phos, ALT, AST, Total. Bili, TP); Future  - Ferritin; Future  - US Pelvic Complete with Transvaginal; Future  - TSH with free T4 reflex  - CBC with Platelets & Differential  - Comprehensive metabolic panel (BMP + Alb, Alk Phos, ALT, AST, Total. Bili, TP)  - Ferritin    Bhavana presents to clinic today with concerns of abnormal uterine bleeding, starting to bleed rather heavily about 5 days following her most recent menses. Lab work today is unremarkable. Ferritin levels have improved since starting the iron supplement a few months ago. Discussed obtaining pelvic ultrasound for further evaluation given history of uterine polyps which she is in agreement with. If needed, can consider referral to OB/GYN or endometrial biopsy pending ultrasound results. Reviewed need to present to the ED with new or worsening bleeding, feelings of light-headedness or dizziness or with uncontrolled bleeding or pain.     I explained my diagnostic considerations and recommendations to the patient, who voiced understanding and agreement with the assessment and treatment plan. All questions were answered to patient's apparent satisfaction. We discussed potential side effects of any prescribed or recommended therapies, as well as expectations for response to treatments and importance of lifestyle measures that may improve symptoms. Patient was advised to contact our office if there are new symptoms or no improvement or worsening of conditions or symptoms.    The longitudinal plan of care for the diagnosis(es)/condition(s) as documented were addressed during this visit. Due to the added complexity in care, I will continue to support Bhavana in the subsequent management and with ongoing continuity of  "care.                  Subjective   Bhavana is a 49 year old, presenting for the following health issues:  Abnormal Bleeding Problem and Dysmenorrhea        1/21/2025     7:31 AM   Additional Questions   Roomed by Cara HODGE LPN     History of Present Illness       Reason for visit:  Menstrual issues  Symptom onset:  3-7 days ago She is missing 7 dose(s) of medications per week.     Bhavana presents to clinic today with concerns of heavy menses with clotting. She has been having heavier periods for the past several years. She has been placed on iron supplementation in the past due to her heavy menses. She notes her periods had been regular. Her last \"normal\" period was 1/7-1/12/25. She again started spotting again on 1/17/25 and since then has had heavy bleeding with clots up to a quarter-golf ball size. She does have some cramping when passing the clots. She denies any additional symptoms, nausea, vomiting, constipation, diarrhea or fever. She takes an iron supplement and fish oil supplement daily, she is otherwise not on any chronic medications. She does have a history of uterine polyps in 2015. She did have surgical intervention at that time.     Patient Active Problem List   Diagnosis    CARDIOVASCULAR SCREENING; LDL GOAL LESS THAN 160    Breast cyst, right     Current Outpatient Medications   Medication Sig Dispense Refill    ferrous gluconate (FERGON) 324 (38 Fe) MG tablet Take 1 tablet (324 mg) by mouth daily (with breakfast) 90 tablet 3    IBUPROFEN PO Take 400 mg by mouth every 6 hours as needed for moderate pain      Multiple Vitamin (MULTI-VITAMIN PO) Take  by mouth. 1 daily (Patient not taking: Reported on 1/21/2025)       No current facility-administered medications for this visit.         Review of Systems  Constitutional, HEENT, cardiovascular, pulmonary, gi and gu systems are negative, except as otherwise noted.      Objective    /86   Pulse 73   Temp 97.7  F (36.5  C) (Temporal)   Resp 16  " " Ht 1.715 m (5' 7.5\")   Wt 70 kg (154 lb 6.4 oz)   LMP 01/18/2025 (Exact Date)   SpO2 99%   BMI 23.83 kg/m    Body mass index is 23.83 kg/m .  Physical Exam  Vitals reviewed.   Constitutional:       General: She is not in acute distress.     Appearance: Normal appearance.   Eyes:      Extraocular Movements: Extraocular movements intact.      Conjunctiva/sclera: Conjunctivae normal.   Pulmonary:      Effort: Pulmonary effort is normal.   Skin:     General: Skin is warm and dry.   Neurological:      Mental Status: She is alert and oriented to person, place, and time. Mental status is at baseline.   Psychiatric:         Mood and Affect: Mood normal.         Behavior: Behavior normal.            Results for orders placed or performed in visit on 01/21/25 (from the past 24 hours)   TSH with free T4 reflex   Result Value Ref Range    TSH 1.11 0.30 - 4.20 uIU/mL   CBC with Platelets & Differential    Narrative    The following orders were created for panel order CBC with Platelets & Differential.  Procedure                               Abnormality         Status                     ---------                               -----------         ------                     CBC with platelets and d...[[id number]]                      Final result                 Please view results for these tests on the individual orders.   Comprehensive metabolic panel (BMP + Alb, Alk Phos, ALT, AST, Total. Bili, TP)   Result Value Ref Range    Sodium 140 135 - 145 mmol/L    Potassium 4.3 3.4 - 5.3 mmol/L    Carbon Dioxide (CO2) 22 22 - 29 mmol/L    Anion Gap 13 7 - 15 mmol/L    Urea Nitrogen 14.1 6.0 - 20.0 mg/dL    Creatinine 0.79 0.51 - 0.95 mg/dL    GFR Estimate >90 >60 mL/min/1.73m2    Calcium 9.7 8.8 - 10.4 mg/dL    Chloride 105 98 - 107 mmol/L    Glucose 97 70 - 99 mg/dL    Alkaline Phosphatase 56 40 - 150 U/L    AST 20 0 - 45 U/L    ALT 23 0 - 50 U/L    Protein Total 7.5 6.4 - 8.3 g/dL    Albumin 4.7 3.5 - 5.2 g/dL    Bilirubin " Total 0.4 <=1.2 mg/dL   Ferritin   Result Value Ref Range    Ferritin 48 6 - 175 ng/mL   CBC with platelets and differential   Result Value Ref Range    WBC Count 7.5 4.0 - 11.0 10e3/uL    RBC Count 4.76 3.80 - 5.20 10e6/uL    Hemoglobin 15.0 11.7 - 15.7 g/dL    Hematocrit 43.3 35.0 - 47.0 %    MCV 91 78 - 100 fL    MCH 31.5 26.5 - 33.0 pg    MCHC 34.6 31.5 - 36.5 g/dL    RDW 12.1 10.0 - 15.0 %    Platelet Count 313 150 - 450 10e3/uL    % Neutrophils 74 %    % Lymphocytes 15 %    % Monocytes 7 %    % Eosinophils 3 %    % Basophils 1 %    % Immature Granulocytes 0 %    NRBCs per 100 WBC 0 <1 /100    Absolute Neutrophils 5.6 1.6 - 8.3 10e3/uL    Absolute Lymphocytes 1.1 0.8 - 5.3 10e3/uL    Absolute Monocytes 0.6 0.0 - 1.3 10e3/uL    Absolute Eosinophils 0.2 0.0 - 0.7 10e3/uL    Absolute Basophils 0.1 0.0 - 0.2 10e3/uL    Absolute Immature Granulocytes 0.0 <=0.4 10e3/uL    Absolute NRBCs 0.0 10e3/uL           Signed Electronically by: Jennifer Rea NP

## 2025-01-23 ENCOUNTER — OFFICE VISIT (OUTPATIENT)
Dept: OBGYN | Facility: OTHER | Age: 50
End: 2025-01-23
Attending: NURSE PRACTITIONER
Payer: COMMERCIAL

## 2025-01-23 VITALS
BODY MASS INDEX: 23.4 KG/M2 | WEIGHT: 154.4 LBS | DIASTOLIC BLOOD PRESSURE: 72 MMHG | SYSTOLIC BLOOD PRESSURE: 122 MMHG | HEIGHT: 68 IN

## 2025-01-23 DIAGNOSIS — N93.9 ABNORMAL UTERINE BLEEDING: ICD-10-CM

## 2025-01-23 DIAGNOSIS — D25.0 SUBMUCOUS LEIOMYOMA OF UTERUS: ICD-10-CM

## 2025-01-23 DIAGNOSIS — N84.0 UTERINE POLYP: ICD-10-CM

## 2025-01-23 NOTE — PROGRESS NOTES
OB/GYN New Consult      NAME:  Bhavana Becker  PCP:  [facility] - [facility]  MRN:  [id number]    Reason for Consult:  endometrial polyp vs fibroid  Referring Provider: Jennifer Rea NP    Impression / Plan     49 year old  with:      ICD-10-CM    1. Abnormal uterine bleeding  N93.9 Ob/Gyn  Referral      2. Submucous leiomyoma of uterus  D25.0 Ob/Gyn  Referral      3. Uterine polyp  N84.0 Ob/Gyn  Referral     Case Request: Hysteroscopy, myosure polypectomy, DILATION AND CURETTAGE, UTERUS     Case Request: Hysteroscopy, myosure polypectomy, DILATION AND CURETTAGE, UTERUS          Reviewed ultrasound findings and recommendations. 8 mm polyp vs fibroid and 3.2 cm submucosal fibroid.   Recommend hysteroscopy, dilation and curettage, myosure polypectomy.  We should be able to remove the 8 mm lesion, but the 3.2 cm lesion may be more intramural and not amenable to removal.  We will also get a good sample of the lining to complete the evaluation of the abnormal uterine bleeding.     Handout given from [facility] regarding management of fibroids.  This includes contraceptives, IUD, implant, dmpa.  Also myfembree, UAE, endoemtrial ablation, and ultimately hysterectomy.      If the patient would like the Mirena, this can be placed at the time of the procedure or in the office upon follow up.  Ablation can be done with a separate procedure.  Plan to discuss management options in more detail at her post op visit.    Chief Complaint     Chief Complaint   Patient presents with    Abnormal Uterine Bleeding       HPI     Bhavana Becker is a  49 year old female who is seen for abnormal uterine bleeding     Patient's last menstrual period was 2025 (exact date).   Heavy periods for years.  Generally every 25-31 days.  Lasting 6-8 days.  Really heavy for at least 2 days, leaks through overnight pads.  Uses a level 4 pad with wings when it is heavy and changes that every  hour when heavy.   Had two cycles this month for the first time.   Clots are significantly larger  Some cramping.  Ibuprofen helps a little.      Polyp removed in  with Dr. Moser.  Op note reviewed. Polyp was removed with a stone forceps.   Periods improved after that.      She used birth control pills after having children but did not feel good on them.    She has really bad palpitations with narcotics.    History of SPONTANEOUS VAGINAL DELIVERY x3.    No known family history of uterine cancer.   Family history of prolapse.  Her largest baby was 10 lbs.     Vasectomy for contraception.      Problem List     Patient Active Problem List    Diagnosis Date Noted    Breast cyst, right 2022     Priority: Medium    CARDIOVASCULAR SCREENING; LDL GOAL LESS THAN 160 10/31/2010     Priority: Medium       Medications     Current Outpatient Medications   Medication Sig Dispense Refill    ferrous gluconate (FERGON) 324 (38 Fe) MG tablet Take 1 tablet (324 mg) by mouth daily (with breakfast) 90 tablet 3    IBUPROFEN PO Take 400 mg by mouth every 6 hours as needed for moderate pain      Omega-3 Fatty Acids (FISH OIL PO) Take by mouth.      Multiple Vitamin (MULTI-VITAMIN PO) Take  by mouth. 1 daily (Patient not taking: Reported on 2025)       No current facility-administered medications for this visit.        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 06    10weeks    Hypertension goal BP (blood pressure) < 140/90        Past Surgical History:   Procedure Laterality Date    COLONOSCOPY N/A 2023    Normal, repeat 10 years    DILATION AND CURETTAGE, OPERATIVE HYSTEROSCOPY, COMBINED N/A 01/15/2015    removal of endometrial polyp         Social History     Social History     Socioeconomic History    Marital status:      Spouse name: Jorden    Number of children: 3    Years of education: 15    Highest education level: Not on file   Occupational History    Occupation: Secretarial     Comment: [employer]   Tobacco Use    Smoking status: Former     Current packs/day: 0.00     Average packs/day: 1 pack/day for 10.0 years (10.0 ttl pk-yrs)     Types: Cigarettes     Start date: 1990     Quit date: 2000     Years since quittin.6    Smokeless tobacco: Never    Tobacco comments:     quit smoking in .   Vaping Use    Vaping status: Never Used   Substance and Sexual Activity    Alcohol use: Yes     Alcohol/week: 1.7 standard drinks of alcohol    Drug use: No    Sexual activity: Yes     Partners: Male     Birth control/protection: Male Surgical     Comment: Vasectomy   Other Topics Concern     Service No    Blood Transfusions No    Caffeine Concern Yes     Comment: She's trying to drink decaf.  Rare caffeinated since she found out she's pregnant.    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    Bike Helmet No    Seat Belt Yes    Self-Exams Yes     Comment: not often    Parent/sibling w/ CABG, MI or angioplasty before 65F 55M? No   Social History Narrative    Lives in [place] with her , Jorden and their sons, Angel and Gregorio and Thierry.  No smokers in the home.  No concerns about domestic violence.  No indoor cats/kittens.   2 dogs, Merlin and Blaze     Social Drivers of Health     Financial Resource Strain: Low Risk  (2024)    Financial Resource Strain     Within the past 12 months, have you or your family members you live with been unable to get utilities (heat, electricity) when it was really needed?: No   Food Insecurity: Low Risk  (2024)     Food Insecurity     Within the past 12 months, did you worry that your food would run out before you got money to buy more?: No     Within the past 12 months, did the food you bought just not last and you didn t have money to get more?: No   Transportation Needs: Low Risk  (6/21/2024)    Transportation Needs     Within the past 12 months, has lack of transportation kept you from medical appointments, getting your medicines, non-medical meetings or appointments, work, or from getting things that you need?: No   Physical Activity: Unknown (6/21/2024)    Exercise Vital Sign     Days of Exercise per Week: 5 days     Minutes of Exercise per Session: Not on file   Stress: No Stress Concern Present (6/21/2024)    [place] citizen [place] of Occupational [employer] - Occupational Stress Questionnaire     Feeling of Stress : Not at all   Social Connections: Unknown (6/21/2024)    Social Connection and Isolation Panel [NHANES]     Frequency of Communication with Friends and Family: Not on file     Frequency of Social Gatherings with Friends and Family: Once a week     Attends Protestant Services: Not on file     Active Member of Clubs or Organizations: Not on file     Attends Club or Organization Meetings: Not on file     Marital Status: Not on file   Interpersonal Safety: Low Risk  (1/21/2025)    Interpersonal Safety     Do you feel physically and emotionally safe where you currently live?: Yes     Within the past 12 months, have you been hit, slapped, kicked or otherwise physically hurt by someone?: No     Within the past 12 months, have you been humiliated or emotionally abused in other ways by your partner or ex-partner?: No   Housing Stability: Low Risk  (6/21/2024)    Housing Stability     Do you have housing? : Yes     Are you worried about losing your housing?: No       Family History      Family History   Problem Relation Age of Onset    Lipids Mother     Hypertension Mother     Arthritis Mother         age 63.    Circulatory  "Mother     Gynecology Mother         cyst on ovary removed at age 34    Heart Disease Mother         SOB, elev. BP  - believed to be MI, but confirmed no MI (RBBB)    Hyperlipidemia Mother     Gastrointestinal Disease Father         gallbladder removed at age 61    Lipids Father     Cerebrovascular Disease Father         X2      Heart Disease Father         pacemaker/ablation     Diabetes Father     Coronary Artery Disease Father     Hypertension Father     Hyperlipidemia Father     Cerebrovascular Disease Maternal Grandmother          of this at age 72    Cancer - colorectal Maternal Grandfather         at age 70's    Respiratory Maternal Grandfather         lung Cancer and empysema    Cancer Paternal Grandmother         brain cancer at age 80's.    Colon Cancer Paternal Grandfather        ROS     Pertinent positives and negatives are listed in the HPI.     Physical Exam   Vitals: /72   Ht 1.715 m (5' 7.5\")   Wt 70 kg (154 lb 6.4 oz)   LMP 2025 (Exact Date)   BMI 23.83 kg/m      General: Comfortable, no obvious distress   Psych: Alert. Appropriate affect,.  Normal speech.   : deferred to OR    Labs/Imaging       I have personally reviewed the labs/imaging and findings were:    TSH   Date Value Ref Range Status   2025 1.11 0.30 - 4.20 uIU/mL Final   2020 1.06 0.40 - 4.00 mU/L Final      Hemoglobin   Date Value Ref Range Status   2025 15.0 11.7 - 15.7 g/dL Final   2020 14.0 11.7 - 15.7 g/dL Final   ]     US 25:  Uterus 8.9 x 7.5 x 5 cm  Submucosal fibroid, 3.2 cm;  Smaller fibroid uterus body 1.6 cm  Endometrial stripe 11 mm, possible polyp 0.8 cm  Normal ovaries bilaterally.  No significant free fluid           45 min spent on the date of the encounter in chart review, patient visit, review of tests, documentation  about the issues documented above.     Margaret Alfaor MD       "

## 2025-01-23 NOTE — PATIENT INSTRUCTIONS
You will be scheduled for a hysteroscopy, myosure polypectomy, dilation and currettage.  This means that we will plan to look in the uterus with a camera, remove the polyp and possibly the fibroid, and take a sample of the lining of the uterus.    You may opt to have a Mirena IUD placed at the same time while you are sleeping.       Our surgery scheduler will contact you within the next couple of days to help you schedule this surgery, as well as the preoperative and postoperative visits.      The surgery generally takes about 30 minutes.  You can expect to go home later that day.  You will need a  and someone to stay with you at home.     You will be given instructions regarding pain management at the time of discharge after the procedure.  Generally we recommend ibuprofen and Tylenol.      You will also be given instructions regarding restrictions at the time of discharge after the procedure.  Recovery takes 1-3 days on average.  You may resume normal daily activities the following day  or as tolerated.  You may return to work or school the following day.    Additional instructions will be provided upon discharge.                            If you have labs or imaging done, the results will automatically release in [employer] without an interpretation.  Your health care professional will review those results and send an interpretation with recommendations as soon as possible, but this may be 1-3 business days.    If you have any questions regarding your visit, please contact your care team.     [employer]: [phone]  Women s Health CLINIC HOURS TELEPHONE NUMBER       MD Michelle Freedman - Certified Medical Assistant     Mitzy Russ-EUGENE Higginbotham-  Shasha-     Monday- [place]  8:00 a.m - 5:00 p.m    Tuesday- Surgery        Thursday- [place]  8:00 a.m - 5:00 p.m.    Friday- [place]  7:30 a.m - 4:00 p.m. [facility]  [address]  [place] [address]  [phone] Fax  [phone] Phone  Imaging Scheduling [phone]     Labor and Delivery  [address] [place] [address]  [phone]    [facility]  [address]  [place], [place] [address] [phone] Phone  [phone] Fax  Imaging Scheduling [phone]     Urgent Care locations:  [facility] Monday-Friday  10 am - 8 pm  Saturday and Sunday   9 am - 5 pm  Monday-Friday   10 am - 8 pm  Saturday and Sunday   9 am - 5 pm   [phone]     **Surgeries** Our Surgery Schedulers will contact you to schedule. If you do not receive a call within 3 business days, please call [phone].    If you need a medication refill, please contact your pharmacy. Please allow 3 business days for your refill to be completed.    As always, thank you for trusting us with your healthcare needs!

## 2025-01-24 PROBLEM — N84.0 UTERINE POLYP: Status: ACTIVE | Noted: 2025-01-23

## 2025-01-29 ENCOUNTER — OFFICE VISIT (OUTPATIENT)
Dept: FAMILY MEDICINE | Facility: OTHER | Age: 50
End: 2025-01-29
Payer: COMMERCIAL

## 2025-01-29 VITALS
BODY MASS INDEX: 24.17 KG/M2 | HEIGHT: 67 IN | OXYGEN SATURATION: 99 % | DIASTOLIC BLOOD PRESSURE: 72 MMHG | SYSTOLIC BLOOD PRESSURE: 131 MMHG | HEART RATE: 79 BPM | WEIGHT: 154 LBS | TEMPERATURE: 98.6 F | RESPIRATION RATE: 20 BRPM

## 2025-01-29 DIAGNOSIS — N84.0 UTERINE POLYP: ICD-10-CM

## 2025-01-29 DIAGNOSIS — Z01.818 PREOP GENERAL PHYSICAL EXAM: Primary | ICD-10-CM

## 2025-01-29 PROCEDURE — 99214 OFFICE O/P EST MOD 30 MIN: CPT | Performed by: FAMILY MEDICINE

## 2025-01-29 ASSESSMENT — PAIN SCALES - GENERAL: PAINLEVEL_OUTOF10: NO PAIN (0)

## 2025-01-29 NOTE — PROGRESS NOTES
Preoperative Evaluation  [facility]  [address]  [place] [address]  Phone: [phone]  Fax: [phone]  Primary Provider: [facility]  Pre-op Performing Provider: Lizz Villa MD  Jan 29, 2025 1/28/2025   Surgical Information   What procedure is being done? Polyp removal and dc potential fibroid removal   Facility or Hospital where procedure/surgery will be performed: [place] [facility]   Who is doing the procedure / surgery? Dr. Alfaro   Date of surgery / procedure: 2/4/25   Time of surgery / procedure: 7:45   Where do you plan to recover after surgery? at home with family     Fax number for surgical facility: Note does not need to be faxed, will be available electronically in Epic.    Assessment & Plan     The proposed surgical procedure is considered LOW risk.    Preop general physical exam    Uterine polyp      Antiplatelet or Anticoagulation Medication Instructions   - Patient is on no antiplatelet or anticoagulation medications.    Additional Medication Instructions   - Herbal medications and vitamins: DO NOT TAKE 14 days prior to surgery.   - ibuprofen (Advil, Motrin): DO NOT TAKE 1 day before surgery.   OK to continue iron supplementation    Recommendation  Approval given to proceed with proposed procedure, without further diagnostic evaluation.    Frieda Bateman is a 49 year old, presenting for the following:  Pre-Op Exam          1/29/2025    10:09 AM   Additional Questions   Roomed by za   Accompanied by self     HPI related to upcoming procedure:  heavy periods with clots, has polyp and fibroid        1/28/2025   Pre-Op Questionnaire   Have you ever had a heart attack or stroke? No   Have you ever had surgery on your heart or blood vessels, such as a stent placement, a coronary artery bypass, or surgery on an artery in your head, neck, heart, or legs? No   Do you have chest pain with activity? No   Do you  have a history of heart failure? No   Do you currently have a cold, bronchitis or symptoms of other infection? No   Do you have a cough, shortness of breath, or wheezing? No   Do you or anyone in your family have previous history of blood clots? (!) YES--dad had strokes   Do you or does anyone in your family have a serious bleeding problem such as prolonged bleeding following surgeries or cuts? (!) UNKNOWN--dad bleeds easy, but is on blood thinners   Have you ever had problems with anemia or been told to take iron pills? (!) YES--currently on iron pills   Have you had any abnormal blood loss such as black, tarry or bloody stools, or abnormal vaginal bleeding? (!) YES--menses   Have you ever had a blood transfusion? No   Are you willing to have a blood transfusion if it is medically needed before, during, or after your surgery? Yes   Have you or any of your relatives ever had problems with anesthesia? No   Do you have sleep apnea, excessive snoring or daytime drowsiness? No   Do you have any artifical heart valves or other implanted medical devices like a pacemaker, defibrillator, or continuous glucose monitor? No   Do you have artificial joints? No   Are you allergic to latex? No     Health Care Directive  Patient does not have a Health Care Directive: Discussed advance care planning with patient; however, patient declined at this time.    Preoperative Review of    reviewed - no record of controlled substances prescribed.        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         Allergies   Allergen Reactions    Morphine And Codeine     Propoxyphene      Darvocet    Sulfa Antibiotics Other (See Comments)     Corneal ulcer from eye drops        Social History     Tobacco Use    Smoking status: Former     Current packs/day: 0.00     Average packs/day: 1 pack/day for 10.0 years (10.0 ttl pk-yrs)     Types: Cigarettes     Start date: 1990     Quit date: 2000     Years since quittin.6    Smokeless tobacco: Never    Tobacco comments:     quit smoking in .   Substance Use Topics    Alcohol use: Yes     Alcohol/week: 1.7 standard drinks of alcohol       History   Drug Use No             Review of Systems  CONSTITUTIONAL: NEGATIVE for fever, chills, change in weight  INTEGUMENTARY/SKIN: NEGATIVE for worrisome rashes, moles or lesions  EYES: NEGATIVE for vision changes or irritation  ENT/MOUTH: NEGATIVE for ear, mouth and throat problems  RESP: NEGATIVE for significant cough or SOB  CV: NEGATIVE for chest pain, palpitations or peripheral edema  GI: NEGATIVE for nausea, abdominal pain, heartburn, or change in bowel habits  : NEGATIVE for frequency, dysuria, or hematuria  MUSCULOSKELETAL: NEGATIVE for significant arthralgias or myalgia  NEURO: NEGATIVE for weakness, dizziness or paresthesias  ENDOCRINE: NEGATIVE for temperature intolerance, skin/hair changes  HEME: NEGATIVE for bleeding problems  PSYCHIATRIC: NEGATIVE for changes in mood or affect    Objective    /72   Pulse 79   Temp 98.6  F (37  C) (Temporal)   Resp 20   Ht 1.7 m (5' 6.93\")   Wt 69.9 kg (154 lb)   LMP 2025 " "(Exact Date)   SpO2 99%   BMI 24.17 kg/m     Estimated body mass index is 24.17 kg/m  as calculated from the following:    Height as of this encounter: 1.7 m (5' 6.93\").    Weight as of this encounter: 69.9 kg (154 lb).  Physical Exam  GENERAL: alert and no distress  EYES: Eyes grossly normal to inspection, PERRL and conjunctivae and sclerae normal  HENT: ear canals and TM's normal, nose and mouth without ulcers or lesions  NECK: no adenopathy, no asymmetry, masses, or scars  RESP: lungs clear to auscultation - no rales, rhonchi or wheezes  CV: regular rate and rhythm, normal S1 S2, no S3 or S4, no murmur, click or rub, no peripheral edema  ABDOMEN: soft, nontender, no hepatosplenomegaly, no masses and bowel sounds normal  MS: no gross musculoskeletal defects noted, no edema  SKIN: no suspicious lesions or rashes  NEURO: Normal strength and tone, mentation intact and speech normal  PSYCH: mentation appears normal, affect normal/bright    Recent Labs   Lab Test 01/21/25  0802 06/21/24  0836   HGB 15.0 13.1     --     140   POTASSIUM 4.3 4.3   CR 0.79 0.82        Diagnostics  No labs were ordered during this visit.   No EKG required, no history of coronary heart disease, significant arrhythmia, peripheral arterial disease or other structural heart disease.    Revised Cardiac Risk Index (RCRI)  The patient has the following serious cardiovascular risks for perioperative complications:   - No serious cardiac risks = 0 points     RCRI Interpretation: 0 points: Class I (very low risk - 0.4% complication rate)         Signed Electronically by: Lizz Villa MD  A copy of this evaluation report is provided to the requesting physician.         "

## 2025-02-03 ENCOUNTER — ANESTHESIA EVENT (OUTPATIENT)
Dept: SURGERY | Facility: AMBULATORY SURGERY CENTER | Age: 50
End: 2025-02-03
Payer: COMMERCIAL

## 2025-02-03 ENCOUNTER — TELEPHONE (OUTPATIENT)
Dept: FAMILY MEDICINE | Facility: CLINIC | Age: 50
End: 2025-02-03
Payer: COMMERCIAL

## 2025-02-03 NOTE — TELEPHONE ENCOUNTER
Pt scheduled for hysteroscopy polypectomy D&C tomorrow, 2/4.    Pt states usually has 2 heavy days and then gets lighter and then stops. Pt changing a pad every 2 hours today for comfort.  Spotting started on 1/29/2025, heavy over the weekend and a bit lighter today- pt anticipates bleeding to be very light/spotting tomorrow.    RN routing to provider to advise on surgery for tomorrow.    Sherri Medellin RN on 2/3/2025 at 9:00 AM

## 2025-02-03 NOTE — TELEPHONE ENCOUNTER
RN called pt and relayed providers advisement.    Patient verbalized understanding and agreed to plan.     Sherri Medellin RN on 2/3/2025 at 9:32 AM

## 2025-02-03 NOTE — TELEPHONE ENCOUNTER
Patient is scheduled for a poypectomy tomorrow with Dr. Alfaro.   She states she got her menstrual cycle over the weekend. She typically has heavy bleeding for the first few days, and then stops. She thinks her cycle will be done by tomorrow but if still present, can she still have her procedure done?    Okay to leave detailed message on voicemail if no answer upon call back.    Mirtha REAN, RN

## 2025-02-04 ENCOUNTER — HOSPITAL ENCOUNTER (OUTPATIENT)
Facility: AMBULATORY SURGERY CENTER | Age: 50
Discharge: HOME | End: 2025-02-04
Attending: OBSTETRICS & GYNECOLOGY | Admitting: OBSTETRICS & GYNECOLOGY
Payer: COMMERCIAL

## 2025-02-04 ENCOUNTER — ANESTHESIA (OUTPATIENT)
Dept: SURGERY | Facility: AMBULATORY SURGERY CENTER | Age: 50
End: 2025-02-04
Payer: COMMERCIAL

## 2025-02-04 VITALS
TEMPERATURE: 98.2 F | DIASTOLIC BLOOD PRESSURE: 61 MMHG | RESPIRATION RATE: 18 BRPM | OXYGEN SATURATION: 100 % | SYSTOLIC BLOOD PRESSURE: 120 MMHG | HEART RATE: 71 BPM

## 2025-02-04 DIAGNOSIS — N93.9 ABNORMAL UTERINE BLEEDING (AUB): Primary | ICD-10-CM

## 2025-02-04 DIAGNOSIS — D25.0 SUBMUCOUS LEIOMYOMA OF UTERUS: ICD-10-CM

## 2025-02-04 LAB — HCG UR QL: NEGATIVE

## 2025-02-04 PROCEDURE — 81025 URINE PREGNANCY TEST: CPT | Performed by: OBSTETRICS & GYNECOLOGY

## 2025-02-04 PROCEDURE — 58561 HYSTEROSCOPY REMOVE MYOMA: CPT | Performed by: OBSTETRICS & GYNECOLOGY

## 2025-02-04 RX ORDER — ONDANSETRON 2 MG/ML
4 INJECTION INTRAMUSCULAR; INTRAVENOUS EVERY 30 MIN PRN
Status: DISCONTINUED | OUTPATIENT
Start: 2025-02-04 | End: 2025-02-05 | Stop reason: HOSPADM

## 2025-02-04 RX ORDER — DEXAMETHASONE SODIUM PHOSPHATE 4 MG/ML
4 INJECTION, SOLUTION INTRA-ARTICULAR; INTRALESIONAL; INTRAMUSCULAR; INTRAVENOUS; SOFT TISSUE
Status: DISCONTINUED | OUTPATIENT
Start: 2025-02-04 | End: 2025-02-05 | Stop reason: HOSPADM

## 2025-02-04 RX ORDER — ACETAMINOPHEN 325 MG/1
975 TABLET ORAL ONCE
Status: DISCONTINUED | OUTPATIENT
Start: 2025-02-04 | End: 2025-02-05 | Stop reason: HOSPADM

## 2025-02-04 RX ORDER — HYDRALAZINE HYDROCHLORIDE 20 MG/ML
2.5-5 INJECTION INTRAMUSCULAR; INTRAVENOUS EVERY 10 MIN PRN
Status: DISCONTINUED | OUTPATIENT
Start: 2025-02-04 | End: 2025-02-05 | Stop reason: HOSPADM

## 2025-02-04 RX ORDER — BUPIVACAINE HYDROCHLORIDE AND EPINEPHRINE 2.5; 5 MG/ML; UG/ML
INJECTION, SOLUTION INFILTRATION; PERINEURAL PRN
Status: DISCONTINUED | OUTPATIENT
Start: 2025-02-04 | End: 2025-02-04 | Stop reason: HOSPADM

## 2025-02-04 RX ORDER — SODIUM CHLORIDE, SODIUM LACTATE, POTASSIUM CHLORIDE, CALCIUM CHLORIDE 600; 310; 30; 20 MG/100ML; MG/100ML; MG/100ML; MG/100ML
INJECTION, SOLUTION INTRAVENOUS CONTINUOUS
Status: DISCONTINUED | OUTPATIENT
Start: 2025-02-04 | End: 2025-02-05 | Stop reason: HOSPADM

## 2025-02-04 RX ORDER — NORETHINDRONE ACETATE AND ETHINYL ESTRADIOL 1MG-20(21)
1 KIT ORAL DAILY
Qty: 84 TABLET | Refills: 3 | Status: SHIPPED | OUTPATIENT
Start: 2025-02-10

## 2025-02-04 RX ORDER — OXYCODONE HYDROCHLORIDE 5 MG/1
10 TABLET ORAL EVERY 4 HOURS PRN
Status: DISCONTINUED | OUTPATIENT
Start: 2025-02-04 | End: 2025-02-05 | Stop reason: HOSPADM

## 2025-02-04 RX ORDER — LIDOCAINE HYDROCHLORIDE 20 MG/ML
INJECTION, SOLUTION INFILTRATION; PERINEURAL PRN
Status: DISCONTINUED | OUTPATIENT
Start: 2025-02-04 | End: 2025-02-04

## 2025-02-04 RX ORDER — NALOXONE HYDROCHLORIDE 0.4 MG/ML
0.1 INJECTION, SOLUTION INTRAMUSCULAR; INTRAVENOUS; SUBCUTANEOUS
Status: DISCONTINUED | OUTPATIENT
Start: 2025-02-04 | End: 2025-02-05 | Stop reason: HOSPADM

## 2025-02-04 RX ORDER — FENTANYL CITRATE 50 UG/ML
25 INJECTION, SOLUTION INTRAMUSCULAR; INTRAVENOUS
Status: DISCONTINUED | OUTPATIENT
Start: 2025-02-04 | End: 2025-02-05 | Stop reason: HOSPADM

## 2025-02-04 RX ORDER — FENTANYL CITRATE 50 UG/ML
INJECTION, SOLUTION INTRAMUSCULAR; INTRAVENOUS PRN
Status: DISCONTINUED | OUTPATIENT
Start: 2025-02-04 | End: 2025-02-04

## 2025-02-04 RX ORDER — FENTANYL CITRATE 50 UG/ML
50 INJECTION, SOLUTION INTRAMUSCULAR; INTRAVENOUS EVERY 5 MIN PRN
Status: DISCONTINUED | OUTPATIENT
Start: 2025-02-04 | End: 2025-02-05 | Stop reason: HOSPADM

## 2025-02-04 RX ORDER — PROPOFOL 10 MG/ML
INJECTION, EMULSION INTRAVENOUS CONTINUOUS PRN
Status: DISCONTINUED | OUTPATIENT
Start: 2025-02-04 | End: 2025-02-04

## 2025-02-04 RX ORDER — TRANEXAMIC ACID 650 MG/1
1300 TABLET ORAL 3 TIMES DAILY
Qty: 30 TABLET | Refills: 0 | Status: SHIPPED | OUTPATIENT
Start: 2025-02-04 | End: 2025-02-09

## 2025-02-04 RX ORDER — OXYCODONE HYDROCHLORIDE 5 MG/1
5 TABLET ORAL EVERY 4 HOURS PRN
Status: DISCONTINUED | OUTPATIENT
Start: 2025-02-04 | End: 2025-02-05 | Stop reason: HOSPADM

## 2025-02-04 RX ORDER — ONDANSETRON 4 MG/1
4 TABLET, ORALLY DISINTEGRATING ORAL EVERY 30 MIN PRN
Status: DISCONTINUED | OUTPATIENT
Start: 2025-02-04 | End: 2025-02-05 | Stop reason: HOSPADM

## 2025-02-04 RX ORDER — METOPROLOL TARTRATE 1 MG/ML
1-2 INJECTION, SOLUTION INTRAVENOUS EVERY 5 MIN PRN
Status: DISCONTINUED | OUTPATIENT
Start: 2025-02-04 | End: 2025-02-05 | Stop reason: HOSPADM

## 2025-02-04 RX ORDER — IBUPROFEN 400 MG/1
800 TABLET, FILM COATED ORAL ONCE
Status: DISCONTINUED | OUTPATIENT
Start: 2025-02-04 | End: 2025-02-05 | Stop reason: HOSPADM

## 2025-02-04 RX ORDER — FENTANYL CITRATE 50 UG/ML
25 INJECTION, SOLUTION INTRAMUSCULAR; INTRAVENOUS EVERY 5 MIN PRN
Status: DISCONTINUED | OUTPATIENT
Start: 2025-02-04 | End: 2025-02-05 | Stop reason: HOSPADM

## 2025-02-04 RX ORDER — LIDOCAINE 40 MG/G
CREAM TOPICAL
Status: DISCONTINUED | OUTPATIENT
Start: 2025-02-04 | End: 2025-02-05 | Stop reason: HOSPADM

## 2025-02-04 RX ORDER — PROPOFOL 10 MG/ML
INJECTION, EMULSION INTRAVENOUS PRN
Status: DISCONTINUED | OUTPATIENT
Start: 2025-02-04 | End: 2025-02-04

## 2025-02-04 RX ORDER — ACETAMINOPHEN 325 MG/1
975 TABLET ORAL ONCE
Status: COMPLETED | OUTPATIENT
Start: 2025-02-04 | End: 2025-02-04

## 2025-02-04 RX ADMIN — ACETAMINOPHEN 975 MG: 325 TABLET ORAL at 06:52

## 2025-02-04 RX ADMIN — FENTANYL CITRATE 25 MCG: 50 INJECTION, SOLUTION INTRAMUSCULAR; INTRAVENOUS at 07:35

## 2025-02-04 RX ADMIN — PROPOFOL 70 MG: 10 INJECTION, EMULSION INTRAVENOUS at 07:37

## 2025-02-04 RX ADMIN — SODIUM CHLORIDE, SODIUM LACTATE, POTASSIUM CHLORIDE, CALCIUM CHLORIDE: 600; 310; 30; 20 INJECTION, SOLUTION INTRAVENOUS at 07:06

## 2025-02-04 RX ADMIN — PROPOFOL 150 MCG/KG/MIN: 10 INJECTION, EMULSION INTRAVENOUS at 07:37

## 2025-02-04 RX ADMIN — LIDOCAINE HYDROCHLORIDE 60 MG: 20 INJECTION, SOLUTION INFILTRATION; PERINEURAL at 07:37

## 2025-02-04 NOTE — ANESTHESIA CARE TRANSFER NOTE
Patient: Bhavana Becker    Procedure: Procedure(s):  Hysteroscopy, myosure polypectomy, myomectomy  DILATION AND CURETTAGE, UTERUS       Diagnosis: Uterine polyp [N84.0]  Diagnosis Additional Information: No value filed.    Anesthesia Type:   General     Note:      Level of Consciousness: awake  Oxygen Supplementation: room air    Independent Airway: airway patency satisfactory and stable  Dentition: dentition unchanged  Vital Signs Stable: post-procedure vital signs reviewed and stable  Report to RN Given: handoff report given  Patient transferred to: Phase II    Handoff Report: Identifed the Patient, Identified the Reponsible Provider, Reviewed the pertinent medical history, Discussed the surgical course, Reviewed Intra-OP anesthesia mangement and issues during anesthesia, Set expectations for post-procedure period and Allowed opportunity for questions and acknowledgement of understanding      Vitals:  Vitals Value Taken Time   /67    Temp 97    Pulse 65    Resp 12    SpO2 97        Electronically Signed By: HERBERT Gomes CRNA  February 4, 2025  8:45 AM

## 2025-02-04 NOTE — DISCHARGE INSTRUCTIONS
Tylenol 975 mg was given at 6:50 am. You can take more tylenol after 12:50 pm. You should not take more then 4,000 mg of tylenol/acetaminophen in a 24 hour period.

## 2025-02-04 NOTE — OP NOTE
OPERATIVE NOTE    Operative Date: 2/4/2025    Surgeon: Margaret Alfaro MD  Assistant:   Surgery assist    Pre-Operative Diagnosis:  49 year old with abnormal uterine bleeding, fibroid uterus, endometrial polyp    Post-Operative Diagnosis:  same    Anesthesia Type: MAC with local    Operative Procedure: Hysteroscopy, Myosure polypectomy and myomectomy  dilation and curettage      Specimens: Endometrial curettings, fibroid, polyp    Fluids Given: See Anesthesia Record  Urine Output: See Anesthesia Record  Estimated Blood Loss: 20 mL    Findings:  normal mobile uterus with multiparous cervix    Complications:  none    Postoperative Condition: stable     Procedure:   The patient was taken to the operating room where MAC commenced.  She was prepped and draped in the normal sterile fashion in the dorsal lithotomy position. Speculum was placed, and the anterior cervix grasped with an allis. The uterus was sounded to 9 cm.  Cervix was dilated without resistance to  7 mm using Hegar dilators. The hysteroscope was assembled in the usual fashion and advanced into the endometrial cavity.  Findings noted above.  The myosure was used to remove the polyp and the majority of the fibroid.  She was given TXA IV as a precaution.  The fluid input into the myosure broke at the end of the procedure.  Bleeding was scant.  The hysteroscope was removed.     Sharp curetting was performed and specimen sent to pathology. The allis was removed, hemostasis noted, and speculum was also removed.     The patient tolerated the procedure well.  Surgical counts were correct at the end of the procedure.  She went to the recovery room in stable condition.        Margaret Alfaro MD

## 2025-02-04 NOTE — ANESTHESIA POSTPROCEDURE EVALUATION
Patient: Bhavana Becker    Procedure: Procedure(s):  Hysteroscopy, myosure polypectomy, myomectomy  DILATION AND CURETTAGE, UTERUS       Anesthesia Type:  General    Note:  Disposition: Outpatient   Postop Pain Control: Uneventful            Sign Out: Well controlled pain   PONV: No   Neuro/Psych: Uneventful            Sign Out: Acceptable/Baseline neuro status   Airway/Respiratory: Uneventful            Sign Out: Acceptable/Baseline resp. status   CV/Hemodynamics: Uneventful            Sign Out: Acceptable CV status; No obvious hypovolemia; No obvious fluid overload   Other NRE: NONE   DID A NON-ROUTINE EVENT OCCUR? No       Last vitals:  Vitals Value Taken Time   /61 02/04/25 0921   Temp 98.2  F (36.8  C) 02/04/25 0921   Pulse 71 02/04/25 0921   Resp 18 02/04/25 0921   SpO2 100 % 02/04/25 0921       Electronically Signed By: Chi Vasquez MD  February 4, 2025  10:53 AM

## 2025-02-04 NOTE — ANESTHESIA PREPROCEDURE EVALUATION
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 06    10weeks     Hypertension goal BP (blood pressure) < 140/90       Past Surgical History:   Procedure Laterality Date     COLONOSCOPY N/A 2023    Normal, repeat 10 years     DILATION AND CURETTAGE, OPERATIVE HYSTEROSCOPY, COMBINED N/A 01/15/2015    removal of endometrial polyp      Allergies   Allergen Reactions     Morphine And Codeine      Propoxyphene      Darvocet     Sulfa Antibiotics Other (See Comments)     Corneal ulcer from eye drops      Social History     Tobacco Use     Smoking status: Former     Current packs/day: 0.00     Average packs/day: 1 pack/day for 10.0 years (10.0 ttl pk-yrs)     Types: Cigarettes     Start date: 1990     Quit date: 2000     Years since quittin.6     Smokeless tobacco: Never     Tobacco comments:     quit smoking in .   Substance Use Topics     Alcohol use: Yes     Alcohol/week: 1.7 standard drinks of alcohol      Wt Readings from Last 1 Encounters:   25 69.9 kg (154 lb)        Anesthesia Evaluation   Pt has not had prior anesthetic         ROS/MED HX  ENT/Pulmonary:  - neg pulmonary ROS     Neurologic:  - neg neurologic ROS     Cardiovascular:     (+)  hypertension- -   -  - -                                      METS/Exercise Tolerance:     Hematologic:  - neg hematologic  ROS     Musculoskeletal:  - neg musculoskeletal ROS     GI/Hepatic:  - neg GI/hepatic ROS     Renal/Genitourinary:  - neg Renal ROS     Endo:  - neg endo ROS     Psychiatric/Substance Use:  - neg psychiatric ROS     Infectious Disease:  - neg infectious disease ROS     Malignancy:  - neg malignancy ROS     Other:  - neg other ROS        Physical Exam    Airway   airway exam normal           Respiratory Devices and Support         Dental    unable to assess        Cardiovascular   cardiovascular exam normal          Pulmonary   pulmonary exam normal            OUTSIDE LABS:  CBC:   Lab Results   Component Value Date    WBC 7.5 01/21/2025    WBC 5.3 04/07/2023    HGB 15.0 01/21/2025    HGB 13.1 06/21/2024    HCT 43.3 01/21/2025    HCT 42.5 04/07/2023     01/21/2025     04/07/2023     BMP:   Lab Results   Component Value Date     01/21/2025     06/21/2024    POTASSIUM 4.3 01/21/2025    POTASSIUM 4.3 06/21/2024    CHLORIDE 105 01/21/2025    CHLORIDE 104 06/21/2024    CO2 22 01/21/2025    CO2 26 06/21/2024    BUN 14.1 01/21/2025    BUN 16.9 06/21/2024    CR 0.79 01/21/2025    CR 0.82 06/21/2024    GLC 97 01/21/2025    GLC 99 06/21/2024     COAGS:   Lab Results   Component Value Date    PTT 33 09/01/2010    INR 1.01 09/01/2010     POC:   Lab Results   Component Value Date    HCG Negative 02/04/2025     HEPATIC:   Lab Results   Component Value Date    ALBUMIN 4.7 01/21/2025    PROTTOTAL 7.5 01/21/2025    ALT 23 01/21/2025    AST 20 01/21/2025    ALKPHOS 56 01/21/2025    BILITOTAL 0.4 01/21/2025     OTHER:   Lab Results   Component Value Date    JO-ANN 9.7 01/21/2025    PHOS 3.7 08/30/2010    MAG 2.0 08/30/2010    LIPASE 44 08/30/2010    TSH 1.11 01/21/2025       Anesthesia Plan    ASA Status:  1    NPO Status:  NPO Appropriate    Anesthesia Type: General.     - Airway: Native airway   Induction: Propofol.   Maintenance: TIVA.        Consents    Anesthesia Plan(s) and associated risks, benefits, and realistic alternatives discussed. Questions answered and patient/representative(s) expressed understanding.     - Discussed:     - Discussed with:  Patient, Spouse      - Extended Intubation/Ventilatory Support Discussed: No.      - Patient is DNR/DNI Status: No     Use of blood products discussed: No .     Postoperative Care    Pain management: IV analgesics,  Oral pain medications.   PONV prophylaxis: Ondansetron (or other 5HT-3), Background Propofol Infusion, Dexamethasone or Solumedrol     Comments:             Chi Vasquez MD    I have reviewed the pertinent notes and labs in the chart from the past 30 days and (re)examined the patient.  Any updates or changes from those notes are reflected in this note.    Clinically Significant Risk Factors Present on Admission

## 2025-02-06 LAB
PATH REPORT.COMMENTS IMP SPEC: NORMAL
PATH REPORT.COMMENTS IMP SPEC: NORMAL
PATH REPORT.FINAL DX SPEC: NORMAL
PATH REPORT.GROSS SPEC: NORMAL
PATH REPORT.MICROSCOPIC SPEC OTHER STN: NORMAL
PATH REPORT.RELEVANT HX SPEC: NORMAL
PHOTO IMAGE: NORMAL

## 2025-02-20 ENCOUNTER — HOSPITAL ENCOUNTER (OUTPATIENT)
Dept: MAMMOGRAPHY | Facility: CLINIC | Age: 50
Discharge: HOME | End: 2025-02-20
Attending: FAMILY MEDICINE
Payer: COMMERCIAL

## 2025-02-20 DIAGNOSIS — Z12.31 ENCOUNTER FOR SCREENING MAMMOGRAM FOR MALIGNANT NEOPLASM OF BREAST: ICD-10-CM

## 2025-02-20 PROCEDURE — 77063 BREAST TOMOSYNTHESIS BI: CPT

## 2025-03-06 ENCOUNTER — OFFICE VISIT (OUTPATIENT)
Dept: OBGYN | Facility: OTHER | Age: 50
End: 2025-03-06
Payer: COMMERCIAL

## 2025-03-06 VITALS — SYSTOLIC BLOOD PRESSURE: 122 MMHG | WEIGHT: 155.7 LBS | BODY MASS INDEX: 24.44 KG/M2 | DIASTOLIC BLOOD PRESSURE: 64 MMHG

## 2025-03-06 DIAGNOSIS — D25.0 SUBMUCOUS LEIOMYOMA OF UTERUS: ICD-10-CM

## 2025-03-06 DIAGNOSIS — Z09 STATUS POST GYNECOLOGICAL SURGERY, FOLLOW-UP EXAM: Primary | ICD-10-CM

## 2025-03-06 PROBLEM — N84.0 UTERINE POLYP: Status: RESOLVED | Noted: 2025-01-23 | Resolved: 2025-03-06

## 2025-03-06 NOTE — PATIENT INSTRUCTIONS
If you have labs or imaging done, the results will automatically release in [facility] without an interpretation.  Your health care professional will review those results and send an interpretation with recommendations as soon as possible, but this may be 1-3 business days.    If you have any questions regarding your visit, please contact your care team.     [employer]: [phone]  [facility] CLINIC HOURS TELEPHONE NUMBER       MD Michelle Freedman - Certified Medical Assistant     Sherri- LEAD RN  Petrona-EUGENE Hyman-EUGENE Higginbotham-  Shasha-     Monday- [place]  8:00 a.m - 5:00 p.m    Tuesday- Surgery        Thursday- [place]  8:00 a.m - 5:00 p.m.    Friday- [place]  7:30 a.m - 4:00 p.m. [facility]  [address]  [place], [place] [address] [phone] Fax  [phone] Phone  Imaging Scheduling [phone]    [facility]  [address] [place] [address] [address]  [place], [place] [address] [phone]    [facility]  [address] [place] [address] [phone] Phone  [phone] Fax  Imaging Scheduling [phone]     Urgent Care locations:  [facility] Monday-Friday  10 am - 8 pm  Saturday and Sunday   9 am - 5 pm  Monday-Friday   10 am - 8 pm  Saturday and Sunday   9 am - 5 pm   [phone]     **Surgeries** Our Surgery Schedulers will contact you to schedule. If you do not receive a call within 3 business days, please call [phone].    If you need a medication refill, please contact your pharmacy. Please allow 3 business days for your refill to be completed.    As always, thank you for trusting us with your healthcare needs!

## 2025-03-06 NOTE — PROGRESS NOTES
Bhavana Becker presents today for her post operative check up.  She had a Hysteroscopy, Myosure polypectomy and myomectomy  dilation and curettage  for abnormal uterine bleeding, fibroid uterus, endometrial polyp .  Submucosal fibroid was 3.2 on recent US.    She is doing well. She started Junel after the procedure and is on the first pack.  She states her bleeding was better, but still heavier that she anticipated.      Patient Active Problem List    Diagnosis Date Noted    Uterine polyp 01/23/2025     Priority: Medium    Breast cyst, right 02/22/2022     Priority: Medium       REVIEW OF SYSTEMS  See HPI, otherwise 10 point ROS neg    Physical Exam:  Vitals:    03/06/25 0757   BP: 122/64   Weight: 70.6 kg (155 lb 11.2 oz)       GENERAL APPEARANCE: well nourished, well hydrated, no acute distress        Pathology report and operative findings reviewed with the patient  Final Diagnosis   Endometrium, curetting:  - Benign endometrial polyp(s)  - Fragments of myometrium consistent with leiomyomas (Specimen consists of a 4.5 x 3.7 x 1.3 cm aggregate)  - Negative for atypia, hyperplasia and malignancy   The myosure was used to remove the polyp and the majority of the fibroid.     Picture showing the base of the fibroid, which was only partially removed.  Base is flush with the uterine wall.       Polyp prior to removal      Post-op Impression:   Post op exam without complications  Heavy menses    Recommendations:  Return to normal activities  May resume sexual activity    Discussed management options for heavy periods.  Plan to give the Junel another couple of months.  If this does not sufficiently help the bleeding, may consider evaluating the residual fibroid and consider ablation, IUD, UAE, hysterectomy.      Margaret Alfaro MD    PA initiated for Bupropion XL through [employer].

## 2025-06-05 ENCOUNTER — OFFICE VISIT (OUTPATIENT)
Dept: OBGYN | Facility: OTHER | Age: 50
End: 2025-06-05
Payer: COMMERCIAL

## 2025-06-05 VITALS — DIASTOLIC BLOOD PRESSURE: 80 MMHG | SYSTOLIC BLOOD PRESSURE: 133 MMHG | WEIGHT: 155.7 LBS | BODY MASS INDEX: 24.44 KG/M2

## 2025-06-05 DIAGNOSIS — D25.0 SUBMUCOUS LEIOMYOMA OF UTERUS: ICD-10-CM

## 2025-06-05 DIAGNOSIS — N93.9 ABNORMAL UTERINE BLEEDING (AUB): Primary | ICD-10-CM

## 2025-06-05 RX ORDER — TRANEXAMIC ACID 650 MG/1
1300 TABLET ORAL 3 TIMES DAILY
Qty: 30 TABLET | Refills: 0 | Status: SHIPPED | OUTPATIENT
Start: 2025-06-16 | End: 2025-06-21

## 2025-06-05 NOTE — PATIENT INSTRUCTIONS
Plan to continue the birth control pill through the active pills then stop.  If bleeding starts and is heavy prior to the scheduled procedure, use lysteda as prescribed.      You will be scheduled for a hysteroscopy, dilation and curettage, Novasure endometrial ablation.  This means that we will plan to look in the uterus, sample the lining, and burn the lining.      Our surgery scheduler will contact you within the next couple of days to help you schedule this surgery, as well as the preoperative and postoperative visits.      The surgery generally takes about 30 minutes.  You can expect to go home later that day.  You will need a  and someone to stay with you at home.     You will be given instructions regarding pain management at the time of discharge after the procedure.  Generally we recommend ibuprofen and Tylenol.      You will also be given instructions regarding restrictions at the time of discharge after the procedure.  Recovery takes 1-3 days on average.  You may resume normal daily activities the following day or as tolerated.  You may return to work or school the following day.    Additional instructions will be provided upon discharge.

## 2025-06-05 NOTE — PROGRESS NOTES
OB/GYN      NAME:  Bhavana Becker  PCP:  [facility]  MRN:  [id number]    Impression / Plan     50 year old  with:      ICD-10-CM    1. Abnormal uterine bleeding (AUB)  N93.9 tranexamic acid (LYSTEDA) 650 MG tablet     Case Request: HYSTEROSCOPY, WITH DILATION AND CURETTAGE OF UTERUS AND NOVASURE ENDOMETRIAL ABLATION     Case Request: HYSTEROSCOPY, WITH DILATION AND CURETTAGE OF UTERUS AND NOVASURE ENDOMETRIAL ABLATION      2. Submucous leiomyoma of uterus  D25.0           Discussed management options for abnormal uterine bleeding, including IUD, ablation, and hysterectomy.    Plan hysteroscopy, dilation and curettage , Novasure endometrial ablation.    Details of the procedure were discussed with the patient.  Risks include, but are not limited to, bleeding, infection, and injury to surrounding organs such as the bowel, urinary system, nerves, and blood vessels.  Injury may result in repair at the time of the surgery or in a separate procedure.  All questions answered, and accepting these risks, the patient elects to proceed with the procedure.   She will be scheduled for preoperative clearance with her PCP.       To continue the birth control pill through the end of the pack, then stop.  Lysteda was provided to use for bleeding until she can have surgery.  Instructions and precautions given.  Chief Complaint     Chief Complaint   Patient presents with    Follow Up       HPI     Bhavana Becker is a  50 year old female who is seen for persistent abnormal uterine bleeding that is worse since the procedure this past winter and not controlled on ABRIL.  Bleeding is likely a combination of perimenopause and partial residual submucosal fibroid (intramural part).  She would like quick results because the bleeding is interfering with daily life and she is missing work.  Clots are large.  She is feeling off with the bleeding and also has more fatigue.       She had a Hysteroscopy,  Myosure polypectomy and myomectomy  dilation and curettage  for abnormal uterine bleeding, fibroid uterus, endometrial polyp 2/4/25.  Submucosal fibroid was 3.2 prior to procedure.      Vasectomy for contraception.    Problem List     Patient Active Problem List    Diagnosis Date Noted    Submucous leiomyoma of uterus 03/06/2025     Priority: Medium    Breast cyst, right 02/22/2022     Priority: Medium       Medications     Current Outpatient Medications   Medication Sig Dispense Refill    ferrous gluconate (FERGON) 324 (38 Fe) MG tablet Take 1 tablet (324 mg) by mouth daily (with breakfast) 90 tablet 3    IBUPROFEN PO Take 400 mg by mouth every 6 hours as needed for moderate pain      norgestimate-ethinyl estradiol (ORTHO-CYCLEN) 0.25-35 MG-MCG tablet Take 1 tablet by mouth daily. 84 tablet 3    [START ON 6/16/2025] tranexamic acid (LYSTEDA) 650 MG tablet Take 2 tablets (1,300 mg) by mouth 3 times daily for 5 days. 30 tablet 0    Omega-3 Fatty Acids (FISH OIL PO) Take by mouth. (Patient not taking: Reported on 6/5/2025)       No current facility-administered medications for this visit.        Allergies     Allergies   Allergen Reactions    Morphine And Codeine     Propoxyphene      Darvocet    Sulfa Antibiotics Other (See Comments)     Corneal ulcer from eye drops       ROS     Pertinent positives and negatives are listed in the HPI.     Physical Exam   Vitals: /80   Wt 70.6 kg (155 lb 11.2 oz)   LMP 05/26/2025 (Exact Date)   BMI 24.44 kg/m      General: Comfortable, no obvious distress     Labs/Imaging       I have personally reviewed the labs/imaging and the findings were:  Final Diagnosis   Endometrium, curetting:  - Benign endometrial polyp(s)  - Fragments of myometrium consistent with leiomyomas (Specimen consists of a 4.5 x 3.7 x 1.3 cm aggregate)  - Negative for atypia, hyperplasia and malignancy   The myosure was used to remove the polyp and the majority of the fibroid.      Picture showing the  base of the fibroid, which was only partially removed.  Base is flush with the uterine wall.        Polyp prior to removal      US 1/21/25:  Uterus 8.9 x 7.5 x 5 cm  Submucosal fibroid, 3.2 cm;  Smaller fibroid uterus body 1.6 cm  Endometrial stripe 11 mm, possible polyp 0.8 cm  Normal ovaries bilaterally.  No significant free fluid               Margaret Alfaro MD

## 2025-06-06 PROBLEM — N93.9 ABNORMAL UTERINE BLEEDING (AUB): Status: ACTIVE | Noted: 2025-06-05

## 2025-06-09 ENCOUNTER — RESULTS FOLLOW-UP (OUTPATIENT)
Dept: FAMILY MEDICINE | Facility: OTHER | Age: 50
End: 2025-06-09

## 2025-06-09 ENCOUNTER — OFFICE VISIT (OUTPATIENT)
Dept: FAMILY MEDICINE | Facility: OTHER | Age: 50
End: 2025-06-09
Payer: COMMERCIAL

## 2025-06-09 VITALS
SYSTOLIC BLOOD PRESSURE: 143 MMHG | RESPIRATION RATE: 15 BRPM | OXYGEN SATURATION: 99 % | HEART RATE: 80 BPM | HEIGHT: 67 IN | DIASTOLIC BLOOD PRESSURE: 86 MMHG | BODY MASS INDEX: 24.8 KG/M2 | TEMPERATURE: 97.3 F | WEIGHT: 158 LBS

## 2025-06-09 DIAGNOSIS — Z13.220 LIPID SCREENING: ICD-10-CM

## 2025-06-09 DIAGNOSIS — D25.0 SUBMUCOUS LEIOMYOMA OF UTERUS: ICD-10-CM

## 2025-06-09 DIAGNOSIS — Z01.818 PREOP GENERAL PHYSICAL EXAM: Primary | ICD-10-CM

## 2025-06-09 DIAGNOSIS — N93.9 ABNORMAL UTERINE BLEEDING: ICD-10-CM

## 2025-06-09 DIAGNOSIS — E61.1 IRON DEFICIENCY: ICD-10-CM

## 2025-06-09 LAB
CHOLEST SERPL-MCNC: 169 MG/DL
ERYTHROCYTE [DISTWIDTH] IN BLOOD BY AUTOMATED COUNT: 12.2 % (ref 10–15)
FASTING STATUS PATIENT QL REPORTED: NO
HCT VFR BLD AUTO: 33 % (ref 35–47)
HDLC SERPL-MCNC: 74 MG/DL
HGB BLD-MCNC: 10.8 G/DL (ref 11.7–15.7)
LDLC SERPL CALC-MCNC: 73 MG/DL
MCH RBC QN AUTO: 30.6 PG (ref 26.5–33)
MCHC RBC AUTO-ENTMCNC: 32.7 G/DL (ref 31.5–36.5)
MCV RBC AUTO: 94 FL (ref 78–100)
NONHDLC SERPL-MCNC: 95 MG/DL
PLATELET # BLD AUTO: 326 10E3/UL (ref 150–450)
RBC # BLD AUTO: 3.53 10E6/UL (ref 3.8–5.2)
TRIGL SERPL-MCNC: 112 MG/DL
WBC # BLD AUTO: 7.4 10E3/UL (ref 4–11)

## 2025-06-09 PROCEDURE — 80061 LIPID PANEL: CPT | Performed by: PHYSICIAN ASSISTANT

## 2025-06-09 PROCEDURE — 99214 OFFICE O/P EST MOD 30 MIN: CPT | Performed by: PHYSICIAN ASSISTANT

## 2025-06-09 PROCEDURE — 3077F SYST BP >= 140 MM HG: CPT | Performed by: PHYSICIAN ASSISTANT

## 2025-06-09 PROCEDURE — 85027 COMPLETE CBC AUTOMATED: CPT | Performed by: PHYSICIAN ASSISTANT

## 2025-06-09 PROCEDURE — 3079F DIAST BP 80-89 MM HG: CPT | Performed by: PHYSICIAN ASSISTANT

## 2025-06-09 PROCEDURE — 36415 COLL VENOUS BLD VENIPUNCTURE: CPT | Performed by: PHYSICIAN ASSISTANT

## 2025-06-09 PROCEDURE — 1126F AMNT PAIN NOTED NONE PRSNT: CPT | Performed by: PHYSICIAN ASSISTANT

## 2025-06-09 ASSESSMENT — PAIN SCALES - GENERAL: PAINLEVEL_OUTOF10: NO PAIN (0)

## 2025-06-09 NOTE — PATIENT INSTRUCTIONS
Patient Education     Preparing for Your Surgery  For Adults  Getting started  In most cases, a nurse will call to review your health history and instructions. They will give you an arrival time based on your scheduled surgery time. Please be ready to share:  Your doctor's clinic name and phone number  Your medical, surgical, and anesthesia history  A list of allergies and sensitivities  A list of medicines, including herbal treatments and over-the-counter drugs  Whether the patient has a legal guardian (ask how to send us the papers in advance)  Note: You may not receive a call if you were seen at our PAC (Preoperative Assessment Center).  Please tell us if you're pregnant--or if there's any chance you might be pregnant. Some surgeries may injure a fetus (unborn baby), so they require a pregnancy test. Surgeries that are safe for a fetus don't always need a test, and you can choose whether to have one.   Preparing for surgery  Within 10 to 30 days of surgery: Have a pre-op exam (sometimes called an H&P, or History and Physical). This can be done at a clinic or pre-operative center.  If you're having a , you may not need this exam. Talk to your care team.  At your pre-op exam, talk to your care team about all medicines you take. (This includes CBD oil and any drugs, such as THC, marijuana, and other forms of cannabis.) If you need to stop any medicine before surgery, ask when to start taking it again.  This is for your safety. Many medicines and drugs can make you bleed too much during surgery. Some change how well surgery (anesthesia) drugs work.  Call your insurance company to let them know you're having surgery. (If you don't have insurance, call [phone].)  Call your clinic if there's any change in your health. This includes a scrape or scratch near the surgery site, or any signs of a cold (sore throat, runny nose, cough, rash, fever).  Eating and drinking guidelines  For your safety: Unless your  surgeon tells you otherwise, follow the guidelines below.  Eat and drink as normal until 8 hours before you arrive for surgery. After that, no food or milk. You can spit out gum when you arrive.  Drink clear liquids until 2 hours before you arrive. These are liquids you can see through, like water, Gatorade, and Propel Water. They also include plain black coffee and tea (no cream or milk).  No alcohol for 24 hours before you arrive. The night before surgery, stop any drinks that contain THC.  If your care team tells you to take medicine on the morning of surgery, it's okay to take it with a sip of water. No other medicines or drugs are allowed (including CBD oil)--follow your care team's instructions.  If you have questions the day of surgery, call your hospital or surgery center.   Preventing infection  Shower or bathe the night before and the morning of surgery. Follow the instructions your clinic gave you. (If no instructions, use regular soap.)  Don't shave or clip hair near your surgery site. We'll remove the hair if needed.  Don't smoke or vape the morning of surgery. No chewing tobacco for 6 hours before you arrive. A nicotine patch is okay. You may spit out nicotine gum when you arrive.  For some surgeries, the surgeon will tell you to fully quit smoking and nicotine.  We will make every effort to keep you safe from infection. We will:  Clean our hands often with soap and water (or an alcohol-based hand rub).  Clean the skin at your surgery site with a special soap that kills germs.  Give you a special gown to keep you warm. (Cold raises the risk of infection.)  Wear hair covers, masks, gowns, and gloves during surgery.  Give antibiotic medicine, if prescribed. Not all surgeries need this medicine.  What to bring on the day of surgery  Photo ID and insurance card  Copy of your health care directive, if you have one  Glasses and hearing aids (bring cases)  You can't wear contacts during surgery  Inhaler and  eye drops, if you use them (tell us about these when you arrive)  CPAP machine or breathing device, if you use them  A few personal items, if spending the night  If you have . . .  A pacemaker, ICD (cardiac defibrillator), or other implant: Bring the ID card.  An implanted stimulator: Bring the remote control.  A legal guardian: Bring a copy of the certified (court-stamped) guardianship papers.  Please remove any jewelry, including body piercings. Leave jewelry and other valuables at home.  If you're going home the day of surgery  You must have a support person drive you home. They should stay with you overnight, and they may need to help with your self-care.  If you don't have a support person, please tells us as soon as possible. We can help.  After surgery  If it's hard to control your pain or you need more pain medicine, please call your surgeon's office.  Questions?   If you have any questions for your care team, list them here:   ____________________________________________________________________________________________________________________________________________________________________________________________________________________________________________________________  For informational purposes only. Not to replace the advice of your health care provider. Copyright   2003, 2019 [employer]. All rights reserved. Clinically reviewed by Rakesh Whitmore MD. [employer] [id number] - REV 02/25.

## 2025-06-09 NOTE — PROGRESS NOTES
Preoperative Evaluation  [facility]  [address]  [place] [address]  Phone: [phone]  Fax: [phone]  Primary Provider: [facility]  Pre-op Performing Provider: Chet Medellin PA-C  Jun 9, 2025 6/8/2025   Surgical Information   What procedure is being done? Ablation   Facility or Hospital where procedure/surgery will be performed: [facility]   Who is doing the procedure / surgery? Dr. Alfaro   Date of surgery / procedure: 6/17/25   Time of surgery / procedure: Unknown   Where do you plan to recover after surgery? at home with family     Fax number for surgical facility: Note does not need to be faxed, will be available electronically in Epic.    Assessment & Plan     The proposed surgical procedure is considered INTERMEDIATE risk.      ICD-10-CM    1. Preop general physical exam  Z01.818       2. Abnormal uterine bleeding  N93.9       3. Submucous leiomyoma of uterus  D25.0       4. Iron deficiency  E61.1 CBC with platelets     CBC with platelets      5. Lipid screening  Z13.220 Lipid panel reflex to direct LDL Non-fasting     Lipid panel reflex to direct LDL Non-fasting          Cleared for surgery.    She remains on oral iron.    Blood pressure slightly elevated during both readings but she had caffeine this morning after a work out. Will continue close monitoring and she will monitor at home.    Non-fasting lipid panel ordered along with updated CBC prior to surgery. She has her annual exam next week.     - No identified additional risk factors other than previously addressed    Antiplatelet or Anticoagulation Medication Instructions  She will hold fish oil and ibuprofen until after surgery.    Additional Medication Instructions  We reviewed the medication list and there are no chronic medications that need to be adjusted for this procedure.    Recommendation  Approval given to proceed with proposed procedure, without  further diagnostic evaluation.      Frieda Bateman is a 50 year old, presenting for the following:  Pre-Op Exam          6/9/2025     7:12 AM   Additional Questions   Roomed by lidya   Accompanied by self     HPI: She has chronic, heavy uterine bleeding and had a hysteroscopy with fibroid removal earlier this year but unfortunately symptoms have worsened since then. She remains on iron and will be undergoing the above procedure.          6/8/2025   Pre-Op Questionnaire   Have you ever had a heart attack or stroke? No   Have you ever had surgery on your heart or blood vessels, such as a stent placement, a coronary artery bypass, or surgery on an artery in your head, neck, heart, or legs? No   Do you have chest pain with activity? No   Do you have a history of heart failure? No   Do you currently have a cold, bronchitis or symptoms of other infection? No   Do you have a cough, shortness of breath, or wheezing? No   Do you or anyone in your family have previous history of blood clots? (!) YES - dad had previous stroke   Do you or does anyone in your family have a serious bleeding problem such as prolonged bleeding following surgeries or cuts? No   Have you ever had problems with anemia or been told to take iron pills? (!) YES - taking iron   Have you had any abnormal blood loss such as black, tarry or bloody stools, or abnormal vaginal bleeding? (!) YES - heavy menstruation   Have you ever had a blood transfusion? No   Are you willing to have a blood transfusion if it is medically needed before, during, or after your surgery? Yes   Have you or any of your relatives ever had problems with anesthesia? No   Do you have sleep apnea, excessive snoring or daytime drowsiness? No   Do you have any artifical heart valves or other implanted medical devices like a pacemaker, defibrillator, or continuous glucose monitor? No   Do you have artificial joints? No   Are you allergic to latex? No     Advance Care  Planning  Discussed advance care planning with patient; informed AVS has link to [employer].      Status of Chronic Conditions:  See problem list for active medical problems.  Problems all longstanding and stable, except as noted/documented.  See ROS for pertinent symptoms related to these conditions.    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    10weeks    Hypertension goal BP (blood pressure) < 140/90      Past Surgical History:   Procedure Laterality Date    COLONOSCOPY N/A 2023    Normal, repeat 10 years    DILATION AND CURETTAGE N/A 2025    Procedure: DILATION AND CURETTAGE, UTERUS;  Surgeon: Margaret Alfaro MD;  Location: [facility] OR    DILATION AND CURETTAGE, OPERATIVE HYSTEROSCOPY, COMBINED N/A 01/15/2015    removal of endometrial polyp    OPERATIVE HYSTEROSCOPY WITH MORCELLATOR N/A 2025    Procedure: Hysteroscopy, myosure polypectomy, myomectomy;  Surgeon: Margaret Alfaro MD;  Location: [facility] OR     Current Outpatient Medications   Medication Sig Dispense Refill    ferrous gluconate (FERGON) 324 (38 Fe) MG tablet Take 1 tablet (324 mg) by mouth daily (with breakfast) 90 tablet 3    IBUPROFEN PO Take 400 mg by mouth every 6 hours as needed for moderate pain      Omega-3 Fatty Acids (FISH OIL PO) Take by mouth.      [START ON 2025] tranexamic acid (LYSTEDA) 650 MG tablet Take 2 tablets (1,300 mg) by mouth 3 times daily for 5 days. (Patient not taking: Reported on 2025) 30 tablet 0       Allergies   Allergen Reactions    Morphine And Codeine     Propoxyphene      Darvocet    Sulfa Antibiotics Other (See Comments)     Corneal ulcer from eye drops        Social History  "    Tobacco Use    Smoking status: Former     Current packs/day: 0.00     Average packs/day: 1 pack/day for 10.0 years (10.0 ttl pk-yrs)     Types: Cigarettes     Start date: 1990     Quit date: 2000     Years since quittin.0    Smokeless tobacco: Never    Tobacco comments:     quit smoking in .   Substance Use Topics    Alcohol use: Yes     Alcohol/week: 1.7 standard drinks of alcohol     History   Drug Use No             Review of Systems  Constitutional, HEENT, cardiovascular, pulmonary, GI, , musculoskeletal, neuro, skin, endocrine and psych systems are negative, except as otherwise noted.    Objective    BP (!) 143/86   Pulse 80   Temp 97.3  F (36.3  C) (Temporal)   Resp 15   Ht 1.7 m (5' 6.93\")   Wt 71.7 kg (158 lb)   LMP 2025 (Exact Date)   SpO2 99%   BMI 24.80 kg/m     Estimated body mass index is 24.8 kg/m  as calculated from the following:    Height as of this encounter: 1.7 m (5' 6.93\").    Weight as of this encounter: 71.7 kg (158 lb).  Physical Exam  GENERAL: alert and no distress  EYES: Eyes grossly normal to inspection, PERRL and conjunctivae and sclerae normal  HENT: ear canals and TM's normal, nose and mouth without ulcers or lesions  NECK: no adenopathy, no asymmetry, masses, or scars  RESP: lungs clear to auscultation - no rales, rhonchi or wheezes  CV: regular rate and rhythm, normal S1 S2, no S3 or S4, no murmur, click or rub, no peripheral edema  ABDOMEN: soft, nontender, no hepatosplenomegaly, no masses and bowel sounds normal  MS: no gross musculoskeletal defects noted, no edema  SKIN: no suspicious lesions or rashes  NEURO: Normal strength and tone, mentation intact and speech normal. Gait is stable.   PSYCH: mentation appears normal, affect normal/bright    Recent Labs   Lab Test 25  0802 24  0836   HGB 15.0 13.1     --     140   POTASSIUM 4.3 4.3   CR 0.79 0.82        Diagnostics  Results for orders placed or performed in visit on " 06/09/25   CBC with platelets     Status: Abnormal   Result Value Ref Range    WBC Count 7.4 4.0 - 11.0 10e3/uL    RBC Count 3.53 (L) 3.80 - 5.20 10e6/uL    Hemoglobin 10.8 (L) 11.7 - 15.7 g/dL    Hematocrit 33.0 (L) 35.0 - 47.0 %    MCV 94 78 - 100 fL    MCH 30.6 26.5 - 33.0 pg    MCHC 32.7 31.5 - 36.5 g/dL    RDW 12.2 10.0 - 15.0 %    Platelet Count 326 150 - 450 10e3/uL        No EKG required, no history of coronary heart disease, significant arrhythmia, peripheral arterial disease or other structural heart disease.    Revised Cardiac Risk Index (RCRI)  The patient has the following serious cardiovascular risks for perioperative complications:   - No serious cardiac risks = 0 points     RCRI Interpretation: 0 points: Class I (very low risk - 0.4% complication rate)         Signed Electronically by: Chet Medellin PA-C  A copy of this evaluation report is provided to the requesting physician.

## 2025-06-16 ENCOUNTER — ANESTHESIA EVENT (OUTPATIENT)
Dept: SURGERY | Facility: AMBULATORY SURGERY CENTER | Age: 50
End: 2025-06-16
Payer: COMMERCIAL

## 2025-06-16 NOTE — ANESTHESIA PREPROCEDURE EVALUATION
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    Hypertension goal BP (blood pressure) < 140/90       Past Surgical History:   Procedure Laterality Date    COLONOSCOPY N/A 2023    Normal, repeat 10 years    DILATION AND CURETTAGE N/A 2025    Procedure: DILATION AND CURETTAGE, UTERUS;  Surgeon: Margaret Alfaro MD;  Location: [facility] OR    DILATION AND CURETTAGE, OPERATIVE HYSTEROSCOPY, COMBINED N/A 01/15/2015    removal of endometrial polyp    OPERATIVE HYSTEROSCOPY WITH MORCELLATOR N/A 2025    Procedure: Hysteroscopy, myosure polypectomy, myomectomy;  Surgeon: Maragret Alfaro MD;  Location: [facility] OR      Allergies   Allergen Reactions    Morphine And Codeine     Propoxyphene      Darvocet    Sulfa Antibiotics Other (See Comments)     Corneal ulcer from eye drops      Social History     Tobacco Use    Smoking status: Former     Current packs/day: 0.00     Average packs/day: 1 pack/day for 10.0 years (10.0 ttl pk-yrs)     Types: Cigarettes     Start date: 1990     Quit date: 2000     Years since quittin.0    Smokeless tobacco: Never    Tobacco comments:     quit smoking in .   Substance Use Topics    Alcohol use: Yes     Alcohol/week: 1.7 standard drinks of alcohol      Wt Readings from Last 1 Encounters:   25 71.7 kg (158 lb)        Anesthesia Evaluation   Pt has not had prior anesthetic         ROS/MED HX  ENT/Pulmonary:  - neg pulmonary ROS     Neurologic:  - neg neurologic ROS     Cardiovascular:     (+)  hypertension- -   -  - -                                      METS/Exercise Tolerance:     Hematologic:  - neg hematologic  ROS     Musculoskeletal:  - neg musculoskeletal  ROS     GI/Hepatic:  - neg GI/hepatic ROS     Renal/Genitourinary:  - neg Renal ROS     Endo:  - neg endo ROS     Psychiatric/Substance Use:  - neg psychiatric ROS     Infectious Disease:  - neg infectious disease ROS     Malignancy:  - neg malignancy ROS     Other:  - neg other ROS            Physical Exam  Airway  Mallampati: I  TM distance: >3 FB  Neck ROM: full  Mouth opening: >= 4 cm    Cardiovascular - normal exam  Rhythm: regular  Rate: normal rate     Dental   (+) Completely normal teeth      Pulmonary - normal examBreath sounds clear to auscultation        Neurological - normal exam  She appears awake, alert and oriented x3.    Other Findings       OUTSIDE LABS:  CBC:   Lab Results   Component Value Date    WBC 7.4 06/09/2025    WBC 7.5 01/21/2025    HGB 10.8 (L) 06/09/2025    HGB 15.0 01/21/2025    HCT 33.0 (L) 06/09/2025    HCT 43.3 01/21/2025     06/09/2025     01/21/2025     BMP:   Lab Results   Component Value Date     01/21/2025     06/21/2024    POTASSIUM 4.3 01/21/2025    POTASSIUM 4.3 06/21/2024    CHLORIDE 105 01/21/2025    CHLORIDE 104 06/21/2024    CO2 22 01/21/2025    CO2 26 06/21/2024    BUN 14.1 01/21/2025    BUN 16.9 06/21/2024    CR 0.79 01/21/2025    CR 0.82 06/21/2024    GLC 97 01/21/2025    GLC 99 06/21/2024     COAGS:   Lab Results   Component Value Date    PTT 33 09/01/2010    INR 1.01 09/01/2010     POC:   Lab Results   Component Value Date    HCG Negative 02/04/2025     HEPATIC:   Lab Results   Component Value Date    ALBUMIN 4.7 01/21/2025    PROTTOTAL 7.5 01/21/2025    ALT 23 01/21/2025    AST 20 01/21/2025    ALKPHOS 56 01/21/2025    BILITOTAL 0.4 01/21/2025     OTHER:   Lab Results   Component Value Date    JO-ANN 9.7 01/21/2025    PHOS 3.7 08/30/2010    MAG 2.0 08/30/2010    LIPASE 44 08/30/2010    TSH 1.11 01/21/2025       Anesthesia Plan    ASA Status:  1      NPO Status: NPO Appropriate   Anesthesia Type: MAC.  Airway: natural airway.  Induction:  intravenous.  Maintenance: TIVA.   Techniques and Equipment:       - Monitoring Plan: standard ASA monitoring     Consents    Anesthesia Plan(s) and associated risks, benefits, and realistic alternatives discussed. Questions answered and patient/representative(s) expressed understanding.     - Discussed:     - Discussed with:  Patient        - Pt is DNR/DNI Status: no DNR          Postoperative Care         Comments:    Other Comments: Anxiolytic/Sedating meds prior to procedure:Midazolam IV  Discussed common and potentially harmful risks for MAC (GA as backup).   These risks include, but were not limited to: Conversion to secured airway, Sore throat, Airway injury, Dental injury, Aspiration, Respiratory issues (Bronchospasm, Laryngospasm, Desaturation), Hemodynamic issues (Arrhythmia, Hypotension, Ischemia), PONV  Risks of invasive procedures were not discussed: N/A  All questions were answered.                 Maico Maciel MD    I have reviewed the pertinent notes and labs in the chart from the past 30 days and (re)examined the patient.  Any updates or changes from those notes are reflected in this note.    Clinically Significant Risk Factors Present on Admission

## 2025-06-17 ENCOUNTER — ANESTHESIA (OUTPATIENT)
Dept: SURGERY | Facility: AMBULATORY SURGERY CENTER | Age: 50
End: 2025-06-17
Payer: COMMERCIAL

## 2025-06-17 ENCOUNTER — HOSPITAL ENCOUNTER (OUTPATIENT)
Facility: AMBULATORY SURGERY CENTER | Age: 50
Discharge: HOME | End: 2025-06-17
Attending: OBSTETRICS & GYNECOLOGY | Admitting: OBSTETRICS & GYNECOLOGY
Payer: COMMERCIAL

## 2025-06-17 VITALS
OXYGEN SATURATION: 100 % | WEIGHT: 158 LBS | TEMPERATURE: 98.2 F | DIASTOLIC BLOOD PRESSURE: 69 MMHG | BODY MASS INDEX: 24.8 KG/M2 | SYSTOLIC BLOOD PRESSURE: 143 MMHG | HEART RATE: 69 BPM | RESPIRATION RATE: 16 BRPM

## 2025-06-17 LAB — HCG UR QL: NEGATIVE

## 2025-06-17 PROCEDURE — G8907 PT DOC NO EVENTS ON DISCHARG: HCPCS

## 2025-06-17 PROCEDURE — 58563 HYSTEROSCOPY ABLATION: CPT | Performed by: OBSTETRICS & GYNECOLOGY

## 2025-06-17 PROCEDURE — 58563 HYSTEROSCOPY ABLATION: CPT

## 2025-06-17 PROCEDURE — 81025 URINE PREGNANCY TEST: CPT | Performed by: OBSTETRICS & GYNECOLOGY

## 2025-06-17 PROCEDURE — 88305 TISSUE EXAM BY PATHOLOGIST: CPT | Performed by: STUDENT IN AN ORGANIZED HEALTH CARE EDUCATION/TRAINING PROGRAM

## 2025-06-17 PROCEDURE — G8918 PT W/O PREOP ORDER IV AB PRO: HCPCS

## 2025-06-17 RX ORDER — ONDANSETRON 4 MG/1
4 TABLET, ORALLY DISINTEGRATING ORAL EVERY 30 MIN PRN
Status: DISCONTINUED | OUTPATIENT
Start: 2025-06-17 | End: 2025-06-18 | Stop reason: HOSPADM

## 2025-06-17 RX ORDER — BUPIVACAINE HCL/EPINEPHRINE 0.5-1:200K
VIAL (ML) INJECTION PRN
Status: DISCONTINUED | OUTPATIENT
Start: 2025-06-17 | End: 2025-06-17 | Stop reason: HOSPADM

## 2025-06-17 RX ORDER — OXYCODONE HYDROCHLORIDE 5 MG/1
5 TABLET ORAL
Status: DISCONTINUED | OUTPATIENT
Start: 2025-06-17 | End: 2025-06-18 | Stop reason: HOSPADM

## 2025-06-17 RX ORDER — DEXAMETHASONE SODIUM PHOSPHATE 4 MG/ML
4 INJECTION, SOLUTION INTRA-ARTICULAR; INTRALESIONAL; INTRAMUSCULAR; INTRAVENOUS; SOFT TISSUE
Status: DISCONTINUED | OUTPATIENT
Start: 2025-06-17 | End: 2025-06-18 | Stop reason: HOSPADM

## 2025-06-17 RX ORDER — KETOROLAC TROMETHAMINE 30 MG/ML
INJECTION, SOLUTION INTRAMUSCULAR; INTRAVENOUS PRN
Status: DISCONTINUED | OUTPATIENT
Start: 2025-06-17 | End: 2025-06-17

## 2025-06-17 RX ORDER — ACETAMINOPHEN 325 MG/1
975 TABLET ORAL ONCE
Status: COMPLETED | OUTPATIENT
Start: 2025-06-17 | End: 2025-06-17

## 2025-06-17 RX ORDER — ACETAMINOPHEN 325 MG/1
975 TABLET ORAL ONCE
Status: DISCONTINUED | OUTPATIENT
Start: 2025-06-17 | End: 2025-06-18 | Stop reason: HOSPADM

## 2025-06-17 RX ORDER — ONDANSETRON 2 MG/ML
4 INJECTION INTRAMUSCULAR; INTRAVENOUS EVERY 30 MIN PRN
Status: DISCONTINUED | OUTPATIENT
Start: 2025-06-17 | End: 2025-06-18 | Stop reason: HOSPADM

## 2025-06-17 RX ORDER — SODIUM CHLORIDE, SODIUM LACTATE, POTASSIUM CHLORIDE, CALCIUM CHLORIDE 600; 310; 30; 20 MG/100ML; MG/100ML; MG/100ML; MG/100ML
INJECTION, SOLUTION INTRAVENOUS CONTINUOUS
Status: DISCONTINUED | OUTPATIENT
Start: 2025-06-17 | End: 2025-06-18 | Stop reason: HOSPADM

## 2025-06-17 RX ORDER — LIDOCAINE 40 MG/G
CREAM TOPICAL
Status: DISCONTINUED | OUTPATIENT
Start: 2025-06-17 | End: 2025-06-18 | Stop reason: HOSPADM

## 2025-06-17 RX ORDER — NALOXONE HYDROCHLORIDE 0.4 MG/ML
0.1 INJECTION, SOLUTION INTRAMUSCULAR; INTRAVENOUS; SUBCUTANEOUS
Status: DISCONTINUED | OUTPATIENT
Start: 2025-06-17 | End: 2025-06-18 | Stop reason: HOSPADM

## 2025-06-17 RX ORDER — IBUPROFEN 400 MG/1
800 TABLET, FILM COATED ORAL ONCE
Status: DISCONTINUED | OUTPATIENT
Start: 2025-06-17 | End: 2025-06-18 | Stop reason: HOSPADM

## 2025-06-17 RX ORDER — FENTANYL CITRATE 50 UG/ML
50 INJECTION, SOLUTION INTRAMUSCULAR; INTRAVENOUS EVERY 5 MIN PRN
Status: DISCONTINUED | OUTPATIENT
Start: 2025-06-17 | End: 2025-06-18 | Stop reason: HOSPADM

## 2025-06-17 RX ORDER — FENTANYL CITRATE 50 UG/ML
INJECTION, SOLUTION INTRAMUSCULAR; INTRAVENOUS PRN
Status: DISCONTINUED | OUTPATIENT
Start: 2025-06-17 | End: 2025-06-17

## 2025-06-17 RX ORDER — PROPOFOL 10 MG/ML
INJECTION, EMULSION INTRAVENOUS PRN
Status: DISCONTINUED | OUTPATIENT
Start: 2025-06-17 | End: 2025-06-17

## 2025-06-17 RX ORDER — LIDOCAINE HYDROCHLORIDE 20 MG/ML
INJECTION, SOLUTION INFILTRATION; PERINEURAL PRN
Status: DISCONTINUED | OUTPATIENT
Start: 2025-06-17 | End: 2025-06-17

## 2025-06-17 RX ORDER — OXYCODONE HYDROCHLORIDE 5 MG/1
10 TABLET ORAL
Status: DISCONTINUED | OUTPATIENT
Start: 2025-06-17 | End: 2025-06-18 | Stop reason: HOSPADM

## 2025-06-17 RX ORDER — FENTANYL CITRATE 50 UG/ML
25 INJECTION, SOLUTION INTRAMUSCULAR; INTRAVENOUS EVERY 5 MIN PRN
Status: DISCONTINUED | OUTPATIENT
Start: 2025-06-17 | End: 2025-06-18 | Stop reason: HOSPADM

## 2025-06-17 RX ADMIN — KETOROLAC TROMETHAMINE 30 MG: 30 INJECTION, SOLUTION INTRAMUSCULAR; INTRAVENOUS at 10:51

## 2025-06-17 RX ADMIN — PROPOFOL 150 MCG/KG/MIN: 10 INJECTION, EMULSION INTRAVENOUS at 10:30

## 2025-06-17 RX ADMIN — PROPOFOL 70 MG: 10 INJECTION, EMULSION INTRAVENOUS at 10:29

## 2025-06-17 RX ADMIN — FENTANYL CITRATE 50 MCG: 50 INJECTION, SOLUTION INTRAMUSCULAR; INTRAVENOUS at 10:30

## 2025-06-17 RX ADMIN — ACETAMINOPHEN 975 MG: 325 TABLET ORAL at 09:41

## 2025-06-17 RX ADMIN — SODIUM CHLORIDE, SODIUM LACTATE, POTASSIUM CHLORIDE, CALCIUM CHLORIDE: 600; 310; 30; 20 INJECTION, SOLUTION INTRAVENOUS at 10:25

## 2025-06-17 RX ADMIN — LIDOCAINE HYDROCHLORIDE 60 MG: 20 INJECTION, SOLUTION INFILTRATION; PERINEURAL at 10:29

## 2025-06-17 NOTE — ANESTHESIA CARE TRANSFER NOTE
Patient: Bhavana Becker    Procedure: Procedure(s):  HYSTEROSCOPY, WITH DILATION AND CURETTAGE OF UTERUS AND NOVASURE ENDOMETRIAL ABLATION       Diagnosis: Abnormal uterine bleeding (AUB) [N93.9]  Diagnosis Additional Information: No value filed.    Anesthesia Type:   MAC     Note:    Oropharynx: oropharynx clear of all foreign objects  Level of Consciousness: awake  Oxygen Supplementation: face mask  Level of Supplemental Oxygen (L/min / FiO2): 6  Independent Airway: airway patency satisfactory and stable  Dentition: dentition unchanged  Vital Signs Stable: post-procedure vital signs reviewed and stable  Report to RN Given: handoff report given  Patient transferred to: Phase II    Handoff Report: Identifed the Patient, Identified the Reponsible Provider, Reviewed the pertinent medical history, Discussed the surgical course, Reviewed Intra-OP anesthesia mangement and issues during anesthesia, Set expectations for post-procedure period and Allowed opportunity for questions and acknowledgement of understanding    Vitals:  Vitals Value Taken Time   BP     Temp 97.2  F (36.2  C) 06/17/25 10:57   Pulse     Resp     SpO2         Electronically Signed By: HERBERT Zeng CRNA  June 17, 2025  10:57 AM

## 2025-06-17 NOTE — ANESTHESIA POSTPROCEDURE EVALUATION
Patient: Bhavana Becker    Procedure: Procedure(s):  HYSTEROSCOPY, WITH DILATION AND CURETTAGE OF UTERUS AND NOVASURE ENDOMETRIAL ABLATION       Anesthesia Type:  MAC    Note:  Disposition: Outpatient   Postop Pain Control: Uneventful            Sign Out: Well controlled pain   PONV: No   Neuro/Psych: Uneventful            Sign Out: Acceptable/Baseline neuro status   Airway/Respiratory: Uneventful            Sign Out: Acceptable/Baseline resp. status   CV/Hemodynamics: Uneventful            Sign Out: Acceptable CV status; No obvious hypovolemia; No obvious fluid overload   Other NRE: NONE   DID A NON-ROUTINE EVENT OCCUR? No           Last vitals:  Vitals Value Taken Time   /69 06/17/25 11:20   Temp 98.2  F (36.8  C) 06/17/25 11:20   Pulse 69 06/17/25 11:20   Resp 16 06/17/25 11:20   SpO2 100 % 06/17/25 11:20       Electronically Signed By: Maico Maciel MD  June 17, 2025  11:48 AM

## 2025-06-17 NOTE — ANESTHESIA CARE TRANSFER NOTE
Patient: Bhavana Becker    Procedure: Procedure(s):  HYSTEROSCOPY, WITH DILATION AND CURETTAGE OF UTERUS AND NOVASURE ENDOMETRIAL ABLATION       Diagnosis: Abnormal uterine bleeding (AUB) [N93.9]  Diagnosis Additional Information: No value filed.    Anesthesia Type:   MAC     Note:    Oropharynx: oropharynx clear of all foreign objects  Level of Consciousness: awake  Oxygen Supplementation: face mask  Level of Supplemental Oxygen (L/min / FiO2): 6  Independent Airway: airway patency satisfactory and stable  Dentition: dentition unchanged  Vital Signs Stable: post-procedure vital signs reviewed and stable  Report to RN Given: handoff report given  Patient transferred to: PACU    Handoff Report: Identifed the Patient, Identified the Reponsible Provider, Reviewed the pertinent medical history, Discussed the surgical course, Reviewed Intra-OP anesthesia mangement and issues during anesthesia, Set expectations for post-procedure period and Allowed opportunity for questions and acknowledgement of understanding    Vitals:  Vitals Value Taken Time   BP     Temp     Pulse     Resp     SpO2         Electronically Signed By: HERBERT Zeng CRNA  June 17, 2025  10:17 AM

## 2025-06-17 NOTE — OP NOTE
OPERATIVE NOTE    Operative Date: 2025  Surgeon: Margaret Alfaro MD   Assistant:  Surgery assist    Pre-Operative Diagnosis:  50 year old  with abnormal uterine bleeding, fibroid uterus     Post-Operative Diagnosis:  same    Anesthesia Type: MAC with local    Operative Procedure: Hysteroscopy, dilation and curettage, Novasure Endometrial ablation    Specimens:  Endometrial curettings     Fluids Given: See Anesthesia Record  Urine Output: See Anesthesia Record  Estimated Blood Loss: 5 mL         Drains:  none    Findings:    Uterus sounded to 9 cm. Cavity size- 4.5 cm length X 4 cm width; hysteroscopic findings noted normal endometrial cavity with both tubal ostia visible; total time of ablation was  71 seconds.  Power 99.  Fluid deficit 65    Complications:  none    Postoperative Condition:  stable             Procedure:   The patient was taken to the operating room where anesthesia commenced.  She was prepped and draped in the normal sterile fashion in the dorsal lithotomy position. Speculum was placed, and the anterior cervix grasped with an allis. Local with epi placed at the anterior lip, 5 and 7 oclock fornices. The uterus was sounded and the cervix was dilated without resistance to  6 mm using Hegar dilators. The hysteroscope was assembled in the usual fashion and advanced into the endometrial cavity.  Findings noted above.  The hysteroscope was removed.      The cervical length was assessed with the suresound.  Sharp curetting was performed and specimen sent to pathology.     The Novasure device was assembled in the usual fashion and set at the assessed cavity length and inserted into the uterus. Cavity width was assessed as noted above. The cavity integrity assessment test was then undertaken and passed and the ablation was initiated.  After 71 seconds, the ablation was complete. The device was then removed from the endometrial cavity and the allis was removed from the cervix.     The patient  tolerated the procedure well.  Surgical counts were correct at the end of the procedure.  She went to the recovery room in stable condition.      Margaret Alfaro MD

## 2025-06-17 NOTE — DISCHARGE INSTRUCTIONS
Tylenol 975 mg was given at 9:40 AM. May take more after 3:40 PM.  You should not take more then 4,000 mg of tylenol/acetaminophen in a 24 hour period.

## 2025-06-19 ENCOUNTER — RESULTS FOLLOW-UP (OUTPATIENT)
Dept: OBGYN | Facility: CLINIC | Age: 50
End: 2025-06-19

## 2025-06-26 ENCOUNTER — ALLIED HEALTH/NURSE VISIT (OUTPATIENT)
Dept: FAMILY MEDICINE | Facility: OTHER | Age: 50
End: 2025-06-26
Payer: COMMERCIAL

## 2025-06-26 VITALS — SYSTOLIC BLOOD PRESSURE: 150 MMHG | DIASTOLIC BLOOD PRESSURE: 82 MMHG

## 2025-06-26 DIAGNOSIS — R03.0 ELEVATED BP WITHOUT DIAGNOSIS OF HYPERTENSION: Primary | ICD-10-CM

## 2025-06-26 NOTE — PROGRESS NOTES
I met with Bhavana Becker at the request of Dorothy Coronado to recheck her blood pressure.  Blood pressure medications on the med list were reviewed with patient.  (not on BP medications)  Patient has taken all medications as per usual regimen: N/A she just takes Iron nothing else  Patient reports tolerating them without any issues or concerns: NA (not on BP medication just taking Iron only)    Vitals:    06/26/25 1559 06/26/25 1605   BP: (!) 160/88 (!) 150/82     Brought her own machine as well to compare to our readings. Her machine reading on right arm(which was opposite of the arm I used): 121/81.    After 5 minutes, the patient's blood pressure remained greater than or equal to 140/90.    Is the patient currently having any chest pain? No  Does the patient currently have a headache? No  Does the patient currently have any vision changes? No  Does the patient currently have any nausea? No  Does the patient currently have any abdominal pain? No    Patient was instructed to get rid of her current BP machine and recommend Omron series 5 or 7 and also should consider starting a BP medication per Lyle Keith. These options were relayed to the patient and she verbalized understanding and said she would be open to starting a BP medication if that is what she has to do, per Lyle Keith we should send to Dorothy Coronado to see what she would want to do since she saw the patient last, otherwise Lyle is willing to start medication if needed. Patient would prefer [facility] in [place] if you will be sending something and she also would prefer MyChart communication.     Kaylen Howard, CMA

## 2025-06-30 ENCOUNTER — MYC MEDICAL ADVICE (OUTPATIENT)
Dept: FAMILY MEDICINE | Facility: CLINIC | Age: 50
End: 2025-06-30
Payer: COMMERCIAL

## 2025-06-30 DIAGNOSIS — I10 BENIGN ESSENTIAL HYPERTENSION: ICD-10-CM

## 2025-06-30 RX ORDER — HYDROCHLOROTHIAZIDE 12.5 MG/1
12.5 TABLET ORAL DAILY
Qty: 30 TABLET | Refills: 1 | Status: SHIPPED | OUTPATIENT
Start: 2025-06-30 | End: 2025-06-30

## 2025-06-30 RX ORDER — LISINOPRIL 5 MG/1
5 TABLET ORAL DAILY
Qty: 30 TABLET | Refills: 1 | Status: SHIPPED | OUTPATIENT
Start: 2025-06-30

## 2025-07-21 DIAGNOSIS — I10 BENIGN ESSENTIAL HYPERTENSION: ICD-10-CM

## 2025-07-21 RX ORDER — HYDROCHLOROTHIAZIDE 12.5 MG/1
12.5 TABLET ORAL DAILY
Qty: 90 TABLET | Refills: 1 | OUTPATIENT
Start: 2025-07-21

## 2025-07-24 ENCOUNTER — OFFICE VISIT (OUTPATIENT)
Dept: OBGYN | Facility: OTHER | Age: 50
End: 2025-07-24
Payer: COMMERCIAL

## 2025-07-24 VITALS — BODY MASS INDEX: 23.93 KG/M2 | DIASTOLIC BLOOD PRESSURE: 83 MMHG | WEIGHT: 154.6 LBS | SYSTOLIC BLOOD PRESSURE: 145 MMHG

## 2025-07-24 DIAGNOSIS — I10 BENIGN ESSENTIAL HYPERTENSION: ICD-10-CM

## 2025-07-24 DIAGNOSIS — Z09 STATUS POST GYNECOLOGICAL SURGERY, FOLLOW-UP EXAM: Primary | ICD-10-CM

## 2025-07-24 RX ORDER — LISINOPRIL 5 MG/1
5 TABLET ORAL DAILY
Qty: 90 TABLET | Refills: 1 | Status: SHIPPED | OUTPATIENT
Start: 2025-07-24

## 2025-07-24 NOTE — PROGRESS NOTES
Bhavana Becker presents today for her post operative check up.  She had a Hysteroscopy, dilation and curettage, Novasure Endometrial ablation  for heavy menses.  She is doing well.  No concerns at this time.  She is eating and drinking well.  No trouble voiding on her own.   No pain at this time.      She had spotting after the procedure that lasted until she started her period the other day.  It was light initially.  It was heavier last night, to the extent she had to get up and change a smaller pad.  She is still wearing the smaller pad today.  She states the flow is better than before the procedure.  No clots.     Patient Active Problem List    Diagnosis Date Noted    Abnormal uterine bleeding (AUB) 06/05/2025     Priority: Medium    Submucous leiomyoma of uterus 03/06/2025     Priority: Medium    Breast cyst, right 02/22/2022     Priority: Medium    Benign essential hypertension 03/30/2011     Priority: Medium     Postpartum 2010.  Resolved.          REVIEW OF SYSTEMS  See HPI, otherwise 10 point ROS neg    Physical Exam:  Vitals:    07/24/25 1018   BP: (!) 145/83   Weight: 70.1 kg (154 lb 9.6 oz)       GENERAL APPEARANCE: well nourished, well hydrated, no acute distress       Pathology report and operative findings reviewed with the patient  Findings:    Uterus sounded to 9 cm. Cavity size- 4.5 cm length X 4 cm width; hysteroscopic findings noted normal endometrial cavity with both tubal ostia visible; total time of ablation was  71 seconds.  Power 99.  Fluid deficit 65   Final Diagnosis   A. Endometrium, curetting:  - Benign endometrial polyp(s)  - Negative for hyperplasia or malignancy       Post-op Impression:   Post op exam without complications  Continued bleeding    Recommendations:  Return to normal activities  May resume sexual activity  Return to PCP for ongoing care  Counseled on management options.  Plan to observe for a period of time. Will consider lysteda if the spotting persists or if the  bleeding is heavy.     Margaret Alfaro MD

## 2025-07-24 NOTE — PATIENT INSTRUCTIONS
Please reach out if your bleeding does not improve and we will consider a short course of Lysteda                                                       If you have any questions regarding your visit, Please contact your care team.     [facility]: [phone]  To Schedule an Appointment 24/7  Call: [phone][facility] HOURS TELEPHONE NUMBER     MD Michelle Freedman- Certified Medical Assistant    Jose Douglas -Surgery Scheduler    Shasha-      [facility]  [facility]4:45 [facility]      Thursday[facility]  8:00 am-4:45 pm      Friday[facility]  7:30 am-4:00 pm    Typical Surgery Day: Tuesday [facility]  [address]  [place], [place] [address] [phone] Appointment Line  [phone] Fax    Imaging Scheduling all locations  [phone] ([facility]  [address]  [place], [place] [address] [phone]    [address] [address] [phone] Appointment Line       **Surgeries** Our Surgery Schedulers will contact you to schedule. If you do not receive a call within 3 business days, please call [phone]    Urgent Care locations:  [facility] Monday-Friday  10 am - 8 pm    Saturday and Sunday   9 am - 5 pm     [phone]   If you need a medication refill, please contact your pharmacy. Please allow 3 business days for your refill to be completed.  As always, Thank you for trusting us with your healthcare needs!      see additional instructions from your care team below

## (undated) DEVICE — SOL NACL 0.9% IRRIG 3000ML BAG 07972-08

## (undated) DEVICE — GLOVE PROTEXIS W/NEU-THERA 7.5  2D73TE75

## (undated) DEVICE — Device

## (undated) DEVICE — DRAPE LEGGINGS 8421

## (undated) DEVICE — KIT ENDO TURNOVER/PROCEDURE CARRY-ON 101822

## (undated) DEVICE — NDL SPINAL 22GA 3.5" QUINCKE 405181

## (undated) DEVICE — SYR 50ML LL W/O NDL 309653

## (undated) DEVICE — PAD PERI W/WINGS 1580A

## (undated) DEVICE — KIT ENDO FIRST STEP DISINFECTANT 200ML W/POUCH EP-4

## (undated) DEVICE — DRSG TELFA 3X8" 1238

## (undated) DEVICE — GLOVE BIOGEL PI MICRO INDICATOR UNDERGLOVE SZ 7.0 48970

## (undated) DEVICE — SOL WATER IRRIG 1000ML BOTTLE 07139-09

## (undated) DEVICE — NDL 19GA 1.5"

## (undated) DEVICE — KIT PROCEDURE FLUENT IN/OUT FLOWPAK TISS TRAP FLT-112S

## (undated) DEVICE — DEVICE TISSUE REMOVAL HYSTEROSCOPIC MYOSURE LITE 30-401LITE

## (undated) DEVICE — GOWN XLG DISP 9545

## (undated) DEVICE — DRAPE SHEET HALF 40X60" 9358

## (undated) DEVICE — SEAL SET MYOSURE ROD LENS SCOPE SINGLE USE 40-902

## (undated) DEVICE — SOL WATER IRRIG 1000ML BOTTLE 2F7114

## (undated) DEVICE — SUCTION CANISTER DOLPHIN 3000ML

## (undated) DEVICE — MG MINOR GYN PACK SMACNMGMGA

## (undated) DEVICE — GLOVE BIOGEL PI ULTRATOUCH G SZ 6.5 42165

## (undated) DEVICE — PREP SKIN SCRUB TRAY 4461A

## (undated) DEVICE — DRAPE GYN/UROLOGY FLUID POUCH TUR 29455

## (undated) DEVICE — TUBING SUCTION 6"X3/16" N56A

## (undated) DEVICE — ESU ABLATION NOVASURE W/SURESOUND NS2007

## (undated) DEVICE — PACK MINOR SBA15MIFSE

## (undated) RX ORDER — ACETIC ACID 5 %
LIQUID (ML) MISCELLANEOUS
Status: DISPENSED
Start: 2025-02-04

## (undated) RX ORDER — ACETAMINOPHEN 325 MG/1
TABLET ORAL
Status: DISPENSED
Start: 2025-02-04

## (undated) RX ORDER — BUPIVACAINE HYDROCHLORIDE AND EPINEPHRINE 5; 5 MG/ML; UG/ML
INJECTION, SOLUTION EPIDURAL; INTRACAUDAL; PERINEURAL
Status: DISPENSED
Start: 2025-02-04

## (undated) RX ORDER — FENTANYL CITRATE 50 UG/ML
INJECTION, SOLUTION INTRAMUSCULAR; INTRAVENOUS
Status: DISPENSED
Start: 2025-06-17

## (undated) RX ORDER — KETOROLAC TROMETHAMINE 30 MG/ML
INJECTION, SOLUTION INTRAMUSCULAR; INTRAVENOUS
Status: DISPENSED
Start: 2025-02-04

## (undated) RX ORDER — PROPOFOL 10 MG/ML
INJECTION, EMULSION INTRAVENOUS
Status: DISPENSED
Start: 2025-02-04

## (undated) RX ORDER — IODINE AND POTASSIUM IODIDE 50; 100 MG/ML; MG/ML
LIQUID ORAL
Status: DISPENSED
Start: 2025-02-04

## (undated) RX ORDER — FERRIC SUBSULFATE 0.21 G/G
LIQUID TOPICAL
Status: DISPENSED
Start: 2025-02-04

## (undated) RX ORDER — SIMETHICONE 40MG/0.6ML
SUSPENSION, DROPS(FINAL DOSAGE FORM)(ML) ORAL
Status: DISPENSED
Start: 2025-06-19

## (undated) RX ORDER — BUPIVACAINE HYDROCHLORIDE AND EPINEPHRINE 2.5; 5 MG/ML; UG/ML
INJECTION, SOLUTION EPIDURAL; INFILTRATION; INTRACAUDAL; PERINEURAL
Status: DISPENSED
Start: 2025-02-04

## (undated) RX ORDER — ACETAMINOPHEN 325 MG/1
TABLET ORAL
Status: DISPENSED
Start: 2025-06-17

## (undated) RX ORDER — TRANEXAMIC ACID 100 MG/ML
INJECTION, SOLUTION INTRAVENOUS
Status: DISPENSED
Start: 2025-02-04

## (undated) RX ORDER — FENTANYL CITRATE 50 UG/ML
INJECTION, SOLUTION INTRAMUSCULAR; INTRAVENOUS
Status: DISPENSED
Start: 2025-02-04